# Patient Record
Sex: FEMALE | Race: WHITE | NOT HISPANIC OR LATINO | Employment: OTHER | URBAN - METROPOLITAN AREA
[De-identification: names, ages, dates, MRNs, and addresses within clinical notes are randomized per-mention and may not be internally consistent; named-entity substitution may affect disease eponyms.]

---

## 2023-08-25 LAB
6-ACETYLMORPHINE: <25 NG/ML
7-AMINOCLONAZEPAM: <25 NG/ML
ALPHA-HYDROXYALPRAZOLAM: <25 NG/ML
ALPHA-HYDROXYMIDAZOLAM: <25 NG/ML
ALPRAZOLAM: <25 NG/ML
AMPHETAMINE (PRESENCE) IN URINE BY SCREEN METHOD: ABNORMAL
BARBITURATES PRESENCE IN URINE BY SCREEN METHOD: ABNORMAL
CANNABINOIDS IN URINE BY SCREEN METHOD: ABNORMAL
CHLORDIAZEPOXIDE: <25 NG/ML
CLONAZEPAM: <25 NG/ML
COCAINE (PRESENCE) IN URINE BY SCREEN METHOD: ABNORMAL
CODEINE: <50 NG/ML
CREATINE, URINE FOR DRUG: 38.1 MG/DL
DIAZEPAM: <25 NG/ML
DRUG SCREEN COMMENT URINE: ABNORMAL
EDDP: <25 NG/ML
FENTANYL CONFIRMATION, URINE: <2.5 NG/ML
HYDROCODONE: <25 NG/ML
HYDROMORPHONE: <25 NG/ML
LORAZEPAM: <25 NG/ML
METHADONE CONFIRMATION,URINE: <25 NG/ML
MIDAZOLAM: <25 NG/ML
MORPHINE URINE: <50 NG/ML
NORDIAZEPAM: <25 NG/ML
NORFENTANYL: <2.5 NG/ML
NORHYDROCODONE: <25 NG/ML
NOROXYCODONE: <25 NG/ML
O-DESMETHYLTRAMADOL: >1000 NG/ML
OXAZEPAM: <25 NG/ML
OXYCODONE: <25 NG/ML
OXYMORPHONE: <25 NG/ML
PHENCYCLIDINE (PRESENCE) IN URINE BY SCREEN METHOD: ABNORMAL
TEMAZEPAM: <25 NG/ML
TRAMADOL: >1000 NG/ML
ZOLPIDEM METABOLITE (ZCA): <25 NG/ML
ZOLPIDEM: <25 NG/ML

## 2023-10-09 DIAGNOSIS — M47.812 CERVICAL SPONDYLOSIS: Primary | ICD-10-CM

## 2023-10-09 RX ORDER — TRAMADOL HYDROCHLORIDE 50 MG/1
50 TABLET ORAL 2 TIMES DAILY PRN
Qty: 60 TABLET | Refills: 0 | Status: SHIPPED | OUTPATIENT
Start: 2023-10-09 | End: 2023-11-06 | Stop reason: SDUPTHER

## 2023-10-09 RX ORDER — TRAMADOL HYDROCHLORIDE 50 MG/1
1 TABLET ORAL 2 TIMES DAILY PRN
COMMUNITY
Start: 2016-03-04 | End: 2023-10-09 | Stop reason: SDUPTHER

## 2023-10-09 RX ORDER — TRAMADOL HYDROCHLORIDE 50 MG/1
50 TABLET ORAL 2 TIMES DAILY PRN
Qty: 10 TABLET | Status: CANCELLED | OUTPATIENT
Start: 2023-10-09

## 2023-10-09 RX ORDER — AMLODIPINE BESYLATE 2.5 MG/1
2.5 TABLET ORAL ONCE
COMMUNITY

## 2023-11-06 DIAGNOSIS — M47.812 CERVICAL SPONDYLOSIS: ICD-10-CM

## 2023-11-06 RX ORDER — TRAMADOL HYDROCHLORIDE 50 MG/1
50 TABLET ORAL 2 TIMES DAILY PRN
Qty: 60 TABLET | Refills: 0 | Status: SHIPPED | OUTPATIENT
Start: 2023-11-06 | End: 2023-12-11 | Stop reason: SDUPTHER

## 2023-12-11 ENCOUNTER — OFFICE VISIT (OUTPATIENT)
Dept: RHEUMATOLOGY | Facility: CLINIC | Age: 83
End: 2023-12-11
Payer: MEDICARE

## 2023-12-11 VITALS
HEART RATE: 96 BPM | TEMPERATURE: 97.3 F | SYSTOLIC BLOOD PRESSURE: 152 MMHG | WEIGHT: 139.8 LBS | HEIGHT: 59 IN | DIASTOLIC BLOOD PRESSURE: 88 MMHG | OXYGEN SATURATION: 99 % | BODY MASS INDEX: 28.18 KG/M2

## 2023-12-11 DIAGNOSIS — M47.812 CERVICAL SPONDYLOSIS: ICD-10-CM

## 2023-12-11 DIAGNOSIS — R26.89 POOR BALANCE: ICD-10-CM

## 2023-12-11 DIAGNOSIS — M15.4 EROSIVE OSTEOARTHRITIS OF BOTH HANDS: ICD-10-CM

## 2023-12-11 DIAGNOSIS — M47.816 LUMBAR SPONDYLOSIS: ICD-10-CM

## 2023-12-11 DIAGNOSIS — M54.81 BILATERAL OCCIPITAL NEURALGIA: Primary | ICD-10-CM

## 2023-12-11 PROBLEM — I48.0 PAROXYSMAL ATRIAL FIBRILLATION (MULTI): Status: ACTIVE | Noted: 2023-12-11

## 2023-12-11 PROBLEM — E03.9 HYPOTHYROID: Status: ACTIVE | Noted: 2023-12-11

## 2023-12-11 PROBLEM — I10 HYPERTENSION: Status: ACTIVE | Noted: 2023-12-11

## 2023-12-11 PROBLEM — K22.70 BARRETT'S ESOPHAGUS WITHOUT DYSPLASIA: Status: ACTIVE | Noted: 2023-12-11

## 2023-12-11 PROCEDURE — 1159F MED LIST DOCD IN RCRD: CPT | Performed by: INTERNAL MEDICINE

## 2023-12-11 PROCEDURE — 1036F TOBACCO NON-USER: CPT | Performed by: INTERNAL MEDICINE

## 2023-12-11 PROCEDURE — 99214 OFFICE O/P EST MOD 30 MIN: CPT | Performed by: INTERNAL MEDICINE

## 2023-12-11 PROCEDURE — 3077F SYST BP >= 140 MM HG: CPT | Performed by: INTERNAL MEDICINE

## 2023-12-11 PROCEDURE — 1160F RVW MEDS BY RX/DR IN RCRD: CPT | Performed by: INTERNAL MEDICINE

## 2023-12-11 PROCEDURE — 3079F DIAST BP 80-89 MM HG: CPT | Performed by: INTERNAL MEDICINE

## 2023-12-11 RX ORDER — OMEPRAZOLE 20 MG/1
CAPSULE, DELAYED RELEASE ORAL
COMMUNITY
Start: 2019-03-05

## 2023-12-11 RX ORDER — ACETAMINOPHEN 325 MG/1
TABLET ORAL EVERY 24 HOURS
COMMUNITY
Start: 2022-03-23

## 2023-12-11 RX ORDER — BIOTIN 10 MG
TABLET ORAL
COMMUNITY
Start: 2015-03-24

## 2023-12-11 RX ORDER — TRAMADOL HYDROCHLORIDE 50 MG/1
50 TABLET ORAL 2 TIMES DAILY PRN
COMMUNITY
End: 2024-02-08 | Stop reason: SDUPTHER

## 2023-12-11 RX ORDER — LOSARTAN POTASSIUM 100 MG/1
TABLET ORAL
COMMUNITY

## 2023-12-11 RX ORDER — CALCIUM CARBONATE/VITAMIN D3 500-10/5ML
LIQUID (ML) ORAL
COMMUNITY
Start: 2015-03-24

## 2023-12-11 RX ORDER — LEVOTHYROXINE SODIUM 88 UG/1
TABLET ORAL
COMMUNITY
Start: 2014-12-16

## 2023-12-11 RX ORDER — RNA INGREDIENT BNT-162B2 0.23 G/1.8ML
INJECTION, SUSPENSION INTRAMUSCULAR
COMMUNITY

## 2023-12-11 RX ORDER — DEXTROMETHORPHAN POLISTIREX 30 MG/5 ML
SUSPENSION, EXTENDED RELEASE 12 HR ORAL
COMMUNITY
Start: 2015-03-24

## 2023-12-11 RX ORDER — GABAPENTIN 300 MG/1
300 CAPSULE ORAL 3 TIMES DAILY
COMMUNITY

## 2023-12-11 RX ORDER — APIXABAN 5 MG/1
TABLET, FILM COATED ORAL
COMMUNITY

## 2023-12-11 RX ORDER — VIT C/E/ZN/COPPR/LUTEIN/ZEAXAN 250MG-90MG
CAPSULE ORAL
COMMUNITY

## 2023-12-11 RX ORDER — GABAPENTIN 300 MG/1
CAPSULE ORAL
COMMUNITY
Start: 2016-11-03 | End: 2023-12-11 | Stop reason: WASHOUT

## 2023-12-11 RX ORDER — TRAMADOL HYDROCHLORIDE 50 MG/1
50 TABLET ORAL 2 TIMES DAILY PRN
Qty: 180 TABLET | Refills: 0 | Status: SHIPPED | OUTPATIENT
Start: 2023-12-11 | End: 2024-01-08 | Stop reason: SDUPTHER

## 2023-12-11 ASSESSMENT — PATIENT HEALTH QUESTIONNAIRE - PHQ9
2. FEELING DOWN, DEPRESSED OR HOPELESS: NOT AT ALL
SUM OF ALL RESPONSES TO PHQ9 QUESTIONS 1 AND 2: 0
1. LITTLE INTEREST OR PLEASURE IN DOING THINGS: NOT AT ALL

## 2023-12-11 ASSESSMENT — ENCOUNTER SYMPTOMS
DEPRESSION: 0
OCCASIONAL FEELINGS OF UNSTEADINESS: 0
LOSS OF SENSATION IN FEET: 0

## 2023-12-11 NOTE — PROGRESS NOTES
Subjective   Patient ID: Jessika Rick is a 83 y.o. female who presents for Follow-up.    HPI 83 year-old female here for follow-up regarding cervical and lumbar spondylosis, as well as occipital neuralgia. 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      Her pain is reasonably well controlled with tramadol 50 mg twice daily.  She gets left buttock pain off and on. Comes and goes.  She is having more trouble going up and down stairs due to knee pain.  Her laundry is in the basement, so she needs to walk down and up the basement stairs.  She completed physical therapy for leg strengthening at the end of March 2023, but she did not think it helped much.  She would like to try reducing tramadol to 50 mg once daily.    She bumper her left pretibial area against a stool last week, got skin tear in left pretibial area.     Bone density done 9/23 ([facility])     She had a previous right carpal tunnel release 2016. EMG 2016 did show moderate to severe bilateral carpal tunnel syndrome.     She had appointment with Dr. Savage 3/23 due to new atrial fibrillation, which was diagnosed 2/10/23. She started eliquis and metoprolol XL 25 mg daily.  30-day event monitor was done.     She has living will and HPOA (daughter Herminia Rick- [phone].      daughter Claudine Garcia- [phone])  She brought her living well documented to the office 5/22/23.     She had right hip revision by Dr. Hurtado March 21, 2022.     Current medications include tramadol 50 mg twice daily and gabapentin 300 mg 2 times daily (for occipital neuralgia).  Gabapentin was reduced due to leg edema.     Opioid contract renewed 8/21/23.. Urine drug screen done 8/02/22- ordered again 8/21/23.     Right hip was replaced 2/20.     She had Pfizer COVID-19 vaccine Jan 29, 2021 and Feb 26, 2021. She got booster October 5, 2021 and 8/22.  She had flu vaccine October 2022.     She started AREDS-2 for macular degeneration.  EMG study 5/16  showed moderate to severe carpal tunnel syndrome in both hands, as well as right C6-T1 radiculopathy.      MRI of cervical spine 6/16 showed possible pannus surrounding odontoid, multilevel degenerative disc disease, probable post traumatic or degenerative changes involving the lateral mass of C2 on the right, and anterior subluxations at C3 4, C4 5, C7-T1, T1-T2, and T2-T3.      She remains on gabapentin 300 mg 3 times daily for occipital neuralgia and tramadol 50 mg 2x daily.      She had right shoulder injected with kenalog 12/19. It was feeling better. She gets slight aching late in day.     She has slight numbness in both feet for the last year.     She has lost 3 1/2 inches in height. Neither parent fractured a hip.   Alendronate was stopped because she was diagnosed with Barretts's esophagus.     She did let me know that her  was recently diagnosed with bladder cancer.      DEXA 12/16 shows femoral neck T score -1.8, total hip T score -2.1, lumbar spine T score -1.3. Total hip BMD is declined 10.4%. Femoral neck BMD is 0.782 g/cm2 ([employer]).  Estimated 10 year fracture risk by FRAX is 5.5% for hip fracture and 16% for major osteoporotic fracture.  She started alendronate 4/17- it was stopped 9/17 because of Raaiza's esophagus.     DEXA 1/19: T score - 1.4 LS, T score -1.8 femoral neck, T score -2.0 total hip.  DEXA August 2021: T score -2.2 femoral neck, T score -2.2 total hip (decline of 6%), T score -1.4 lumbar spine  . Estimated 10-year fracture risk by FRAX is 5.53% for hip fracture and 16.76% for major osteoporotic fracture.     Labs 3/15: BUN 24, creatinine 1.3, KYA negative, rheumatoid factor negative, citrulline antibody negative, ESR 5.  Labs 6/16: ESR 4, CRP negative.   Labs 9/18: ESR 13, CRP 0.28   Labs 4/20: CMP normal, CBC normal  Labs March 2022: Hemoglobin 10.3, hematocrit 32.5, white blood cell count 5.32, platelets 338, CMP normal except albumin 3.8.       ROS:  General: Denies fevers  "or chills.  CV: Denies chest pain or palpitations.  Denies leg edema.  Lungs: Denies coughing or shortness of breath.  Skin: Denies rashes or nodules.  MS: Denies joint pain or joint swelling.     Objective   BP (!) 158/98 (BP Location: Left arm, Patient Position: Sitting, BP Cuff Size: Small adult)   Pulse 96   Temp 36.3 °C (97.3 °F)   Ht 1.499 m (4' 11\")   Wt 63.4 kg (139 lb 12.8 oz)   SpO2 99%   BMI 28.24 kg/m²     Physical Exam  Gen: Well nourished, well appearing.  HEENT: PERRL, EOMI  Neck: Supple, no nodes.  CV: Irregularly irregular rhythm.  No murmur heard.  Lungs: Clear, no rales or wheezes.  Abdomen: Soft, nontender. No hepatosplenomegaly.  Extremities:  No cyanosis, clubbing, or edema.  MS: No synovitis.  Mild kyphosis and scoliosis.  Bony prominence of several DIP joints, consistent with erosive OA.  Skin: No rashes or nodules.  Skin tear noted on left pretibial area.  No evidence of infection.      Assessment/Plan   Problem List Items Addressed This Visit             ICD-10-CM    Cervical spondylosis M47.812    Bilateral occipital neuralgia - Primary M54.81    Erosive osteoarthritis of both hands M15.4         1. Osteopenia- estimated 10 year fracture risk by FRAX is 5.5% for hip fracture, 16% for major osteoporotic fracture. 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 DEXA 8/21 is slightly worse. For now, she will continue calcium citrate 500 mg daily and vitamin D 1000 IU daily.   Next DEXA is due August 2023.     2. Cervical spondylosis and occipital neuralgia. Stable.   Continue gabapentin 300 mg 2 times daily.   Continue tramadol 50 mg twice daily as needed. Attempted to review OARRS reviewed today- site down,   Opioid contract signed 8/21/23.   Urine drug screen done 8/02/22- ordered 8/21/23.   Follow-up in 90 days.     3. Right hip pain-she had a right hip revision March 21, 2022 .she is currently progressing well.      4. Right rotator cuff tendinitis- " better after kenalog injection 12/19.      5. Erosive OA of hands- I advised the treatment is symptomatic. Nothing is proven to prevent progression.     6. BMI 28.2- better.     7. New onset atrial fib. Has established with Dr. Savage in cardiology. Will follow up in a few weeks.    8. Essential hypertension- above goal. Will follow up with cardiology in 2 weeks.     Plan:  Please send me a copy of your bone density.  Opioid contract signed August 21,2023. Urine drug screen done August 2023.-   You can try reducing tramadol to 50 mg once daily.  Referred for physical therapy to assist with balance and mobility.  Follow up in 3 months.  Phone sooner if needed.

## 2024-01-08 DIAGNOSIS — M47.812 CERVICAL SPONDYLOSIS: ICD-10-CM

## 2024-01-08 RX ORDER — TRAMADOL HYDROCHLORIDE 50 MG/1
50 TABLET ORAL 2 TIMES DAILY PRN
Qty: 180 TABLET | Refills: 0 | Status: SHIPPED | OUTPATIENT
Start: 2024-01-08 | End: 2024-04-15 | Stop reason: SDUPTHER

## 2024-01-11 ENCOUNTER — EVALUATION (OUTPATIENT)
Dept: PHYSICAL THERAPY | Facility: CLINIC | Age: 84
End: 2024-01-11
Payer: MEDICARE

## 2024-01-11 DIAGNOSIS — R26.89 POOR BALANCE: ICD-10-CM

## 2024-01-11 DIAGNOSIS — R26.89 BALANCE PROBLEM: Primary | ICD-10-CM

## 2024-01-11 DIAGNOSIS — M47.812 CERVICAL SPONDYLOSIS: ICD-10-CM

## 2024-01-11 DIAGNOSIS — M47.816 LUMBAR SPONDYLOSIS: ICD-10-CM

## 2024-01-11 PROCEDURE — 97110 THERAPEUTIC EXERCISES: CPT | Mod: GP

## 2024-01-11 PROCEDURE — 97161 PT EVAL LOW COMPLEX 20 MIN: CPT | Mod: GP

## 2024-01-11 ASSESSMENT — ENCOUNTER SYMPTOMS
DEPRESSION: 0
OCCASIONAL FEELINGS OF UNSTEADINESS: 1
LOSS OF SENSATION IN FEET: 1

## 2024-01-11 ASSESSMENT — PAIN - FUNCTIONAL ASSESSMENT: PAIN_FUNCTIONAL_ASSESSMENT: 0-10

## 2024-01-11 ASSESSMENT — PAIN SCALES - GENERAL: PAINLEVEL_OUTOF10: 0 - NO PAIN

## 2024-01-11 NOTE — LETTER
January 11, 2024    Sheron Fajardo, PT  [address]  [facility] [address]    Patient: Jessika Rick   YOB: 1940   Date of Visit: 1/11/2024       Dear Jeannie Esparza Md  [address]  [place] [address]    The attached plan of care is being sent to you because your patient’s medical reimbursement requires that you certify the plan of care. Your signature is required to allow uninterrupted insurance coverage.      You may indicate your approval by signing below and faxing this form back to us at Dept Fax: [phone].    Please call Dept: [phone] with any questions or concerns.    Thank you for this referral,        Sheron Fajardo PT  [facility] [address]  [facility]  [address] [facility] [id number] [phone]    Payer: Payor: [employer]ICARE / Plan: [employer] MEDICARE / Product Type: *No Product type* /                                                                         Date:     Dear Sheron Fajardo, PT,     Re: Ms. Jessika Rick, MRN:[id number]    I certify that I have reviewed the attached plan of care and it is medically necessary for Ms. Jessika Rick (1940) who is under my care.          ______________________________________                    _________________  Provider name and credentials                                           Date and time                                                                                           Plan of Care 1/11/24   Effective from: 1/11/2024  Effective to: 4/10/2024    Plan ID: [id number]            Participants as of Finalize on 1/11/2024    Name Type Comments Contact Info    Jeannie Esparza MD Referring Provider  [phone]    Sheron Fajardo PT Physical Therapist  [phone]       Last Plan Note     Author: Sheron Fajardo PT Status: Incomplete Last edited: 1/11/2024  4:00 PM       Physical Therapy Evaluation and Treatment       Patient Name: Jessika Rick  MRN: [id number]  Today's Date: 2024  Time Calculation  Start Time: 1627  Stop Time: 1710  Time Calculation (min): 43 min    Insurance reviewed   Visit number: 1  Approved number of visits: MN  Authorization not required after evaluation   Insurance Type: [facility]  Deductible: $0, $1500 OOP, 100% coverage  Onset Date: 2022  Medicare Certification Period: Beginnin2024 Endin/10/2024     Assessment:  Patient is an 83 year old female who presents for evaluation of chronic balance problems and gait deviations that are contributing to recent falls and patient concerns regarding to upcoming winter season. Patient presents today with notable postural deviations including forward head, rounded shoulders, increased thoracic kyphosis, and dowager's hump. Throughout gait cycle patient displays significant downward gaze leading to forward flexed trunk, L > R knee genu valgum, and R > L hip Trendelenberg consistent with history of R RAJANI and revision. Patient's presentation is consistent with MD prescribed cervical and lumbar spondylosis and patient-reported deficits in balance; she should benefit from skilled PT intervention focused on postural re-education, gait training, and fall prevention.  PT Assessment  PT Assessment Results: Decreased strength, Decreased range of motion, Impaired balance, Decreased mobility, Orthopedic restrictions  Rehab Prognosis: Fair     Plan:  OP PT Plan  Treatment/Interventions: Education/ Instruction, Gait training, Manual therapy, Neuromuscular re-education, Self care/ home management, Therapeutic activities, Therapeutic exercises  PT Plan: Skilled PT  PT Frequency: 1 time per week  Certification Period Start Date: 24  Certification Period End Date: 04/10/24    Current Problem:   1. Balance problem  Follow Up In Physical Therapy      2. Cervical spondylosis  Referral to Physical Therapy      3. Lumbar spondylosis  Referral to Physical Therapy  "     4. Poor balance  Referral to Physical Therapy        Subjective   Patient is an 83 year old female who presents for evaluation of chronic balance impairments that lead to a fall sustained in July 2023. Patient reports that while her balance has been an issue for 10+ years, it has worsened in the last 1-2 years limiting her ability to ambulate prolonged distances without fear. Patient has undergone therapeutic interventions in the past including PT and corticosteroid injections for underlying arthritis without notable relief.  Patient's chief complaint at this time is difficulty descending > ascending stairs at home, required of completing laundry in the basement. Patient does keep a straight cane in the car (from post-operative RAJANI) for prolonged community negotiation, but does not have it throughout today's evaluation.  Patient is scheduled to follow-up with rheumatology every 3 months and is seeing podiatry in 1-2 weeks due to \"bunion\". Patient does report neuropathies in B legs and feet for >1 year for which she is taking Eloquis, but she is concerned about notable LE pitting edema and lines from her socks. She was encouraged to wear compression socks/stockings, but has difficulty donning them independently.  General:  General  Reason for Referral: balance problems  Referred By: Dr. Roberson  Past Medical History Relevant to Rehab: R RAJANI and revision (2020 and 2022)  Precautions:  Precautions  STEADI Fall Risk Score (The score of 4 or more indicates an increased risk of falling): 9  Medical Precautions: No known precautions/limitation  Post-Surgical Precautions: Right hip precautions  Pain:  Pain Assessment  Pain Assessment: 0-10  Pain Score: 0 - No pain  Home Living:  Home Living  Home Living Comment: lives with daughter in ranch home, with laundry in basement (B handrails)  Prior Level of Function:  Prior Function Per Pt/Caregiver Report  Level of Gamaliel: Independent with ADLs and functional " "transfers  Receives Help From: Family  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, can walk extended distances with cart to push/support her balance)    Objective  Lumbar AROM:  Flexion: fingertips to malleoli  Extension: ~10%  L Lateral Flexion: 43.5 cm fingertips to floor  R Lateral Flexion: 41 cm fingertips to floor    Balance Assessment:  Rhomberg stance (EO): 30 seconds, minimal postural sway  Rhomberg stance (EC): 30 seconds, minimal postural sway  Tandem stance (L foot posterior): 14 seconds, 1 UE \"tap\" at 5 seconds  Tandem stance (R foot posterior): 19 seconds, moderate postural sway, R-sided Trendelenberg    Outcome Measures:  Other Measures  Oswestry Disablity Index (SCHUYLER): 22% disability     Treatments:  Access Code: [email or web address]  URL: [email or web address]    Exercises  - Seated Long Arc Quad  - 1-2 x daily - 2 sets - 10 reps - 3 seconds hold  - Standing Hip Abduction with Counter Support  - 1-2 x daily - 2 sets - 10 reps  - Standing Hip Extension with Counter Support  - 1-2 x daily - 2 sets - 10 reps  - Standing Knee Flexion with Counter Support  - 1-2 x daily - 2 sets - 10 reps  - Forward Step Up with Counter Support  - 1-2 x daily - 10 reps  - Lateral Step Up with Counter Support  - 1-2 x daily - 10 reps  - Semi-Tandem Corner Balance With Eyes Open  - as tolerated hold    EDUCATION:  Outpatient Education  Individual(s) Educated: Patient  Education Provided: Anatomy, Fall Risk, Home Exercise Program, POC, Posture  Risk and Benefits Discussed with Patient/Caregiver/Other: yes  Patient/Caregiver Demonstrated Understanding: yes  Plan of Care Discussed and Agreed Upon: yes  Patient Response to Education: Patient/Caregiver Verbalized Understanding of Information    Goals:  Patient will demonstrate independence with home exercise program in order to maximize carryover between treatment sessions by first follow-up (in ~1 month due to scheduling " conflicts).  Patient will be able to achieve and maintain tandem stance for >10 seconds B without UE support by time of discharge in order to minimize fall risk.  Patient will improve B lateral trunk flexion by >3 cm without provocation of symptoms and/or patient-reported fear of falling by time of discharge.  Patient will improve score on modified Oswestry to report <12% disability, by time of discharge, in order to demonstrate a clinically significant improvement and increased functional mobility/independence.         Current Participants as of 1/11/2024    Name Type Comments Contact Info    Jeannie Roberson MD Referring Provider  [phone]    Signature pending    Sheron Fajardo, PT Physical Therapist  [phone]    Signature pending

## 2024-01-11 NOTE — PROGRESS NOTES
Physical Therapy Evaluation and Treatment      Patient Name: Jessika Rick  MRN: [id number]  Today's Date: 2024  Time Calculation  Start Time: 1627  Stop Time: 1710  Time Calculation (min): 43 min    Insurance reviewed   Visit number: 1  Approved number of visits: MN  Authorization not required after evaluation   Insurance Type: [facility]  Deductible: $0, $1500 OOP, 100% coverage  Onset Date: 2022  Medicare Certification Period: Beginnin2024 Endin/10/2024     Assessment:  Patient is an 83 year old female who presents for evaluation of chronic balance problems and gait deviations that are contributing to recent falls and patient concerns regarding to upcoming winter season. Patient presents today with notable postural deviations including forward head, rounded shoulders, increased thoracic kyphosis, and dowager's hump. Throughout gait cycle patient displays significant downward gaze leading to forward flexed trunk, L > R knee genu valgum, and R > L hip Trendelenberg consistent with history of R RAJANI and revision. Patient's presentation is consistent with MD prescribed cervical and lumbar spondylosis and patient-reported deficits in balance; she should benefit from skilled PT intervention focused on postural re-education, gait training, and fall prevention.  PT Assessment  PT Assessment Results: Decreased strength, Decreased range of motion, Impaired balance, Decreased mobility, Orthopedic restrictions  Rehab Prognosis: Fair     Plan:  OP PT Plan  Treatment/Interventions: Education/ Instruction, Gait training, Manual therapy, Neuromuscular re-education, Self care/ home management, Therapeutic activities, Therapeutic exercises  PT Plan: Skilled PT  PT Frequency: 1 time per week  Certification Period Start Date: 24  Certification Period End Date: 04/10/24    Current Problem:   1. Balance problem  Follow Up In Physical Therapy      2. Cervical spondylosis  Referral to Physical Therapy      3.  "Lumbar spondylosis  Referral to Physical Therapy      4. Poor balance  Referral to Physical Therapy        Subjective    Patient is an 83 year old female who presents for evaluation of chronic balance impairments that lead to a fall sustained in July 2023. Patient reports that while her balance has been an issue for 10+ years, it has worsened in the last 1-2 years limiting her ability to ambulate prolonged distances without fear. Patient has undergone therapeutic interventions in the past including PT and corticosteroid injections for underlying arthritis without notable relief.  Patient's chief complaint at this time is difficulty descending > ascending stairs at home, required of completing laundry in the basement. Patient does keep a straight cane in the car (from post-operative RAJANI) for prolonged community negotiation, but does not have it throughout today's evaluation.  Patient is scheduled to follow-up with rheumatology every 3 months and is seeing podiatry in 1-2 weeks due to \"bunion\". Patient does report neuropathies in B legs and feet for >1 year for which she is taking Eloquis, but she is concerned about notable LE pitting edema and lines from her socks. She was encouraged to wear compression socks/stockings, but has difficulty donning them independently.  General:  General  Reason for Referral: balance problems  Referred By: Dr. Roberson  Past Medical History Relevant to Rehab: R RAJANI and revision (2020 and 2022)  Precautions:  Precautions  STEADI Fall Risk Score (The score of 4 or more indicates an increased risk of falling): 9  Medical Precautions: No known precautions/limitation  Post-Surgical Precautions: Right hip precautions  Pain:  Pain Assessment  Pain Assessment: 0-10  Pain Score: 0 - No pain  Home Living:  Home Living  Home Living Comment: lives with daughter in ranch home, with laundry in basement (B handrails)  Prior Level of Function:  Prior Function Per Pt/Caregiver Report  Level of " "[place]: Independent with ADLs and functional transfers  Receives Help From: Family  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, can walk extended distances with cart to push/support her balance)    Objective   Lumbar AROM:  Flexion: fingertips to malleoli  Extension: ~10%  L Lateral Flexion: 43.5 cm fingertips to floor  R Lateral Flexion: 41 cm fingertips to floor    Balance Assessment:  Rhomberg stance (EO): 30 seconds, minimal postural sway  Rhomberg stance (EC): 30 seconds, minimal postural sway  Tandem stance (L foot posterior): 14 seconds, 1 UE \"tap\" at 5 seconds  Tandem stance (R foot posterior): 19 seconds, moderate postural sway, R-sided Trendelenberg    Outcome Measures:  Other Measures  Oswestry Disablity Index (SCHUYLER): 22% disability     Treatments:  Access Code: [email or web address]  URL: [email or web address]    Exercises  - Seated Long Arc Quad  - 1-2 x daily - 2 sets - 10 reps - 3 seconds hold  - Standing Hip Abduction with Counter Support  - 1-2 x daily - 2 sets - 10 reps  - Standing Hip Extension with Counter Support  - 1-2 x daily - 2 sets - 10 reps  - Standing Knee Flexion with Counter Support  - 1-2 x daily - 2 sets - 10 reps  - Forward Step Up with Counter Support  - 1-2 x daily - 10 reps  - Lateral Step Up with Counter Support  - 1-2 x daily - 10 reps  - Semi-Tandem Corner Balance With Eyes Open  - as tolerated hold    EDUCATION:  Outpatient Education  Individual(s) Educated: Patient  Education Provided: Anatomy, Fall Risk, Home Exercise Program, POC, Posture  Risk and Benefits Discussed with Patient/Caregiver/Other: yes  Patient/Caregiver Demonstrated Understanding: yes  Plan of Care Discussed and Agreed Upon: yes  Patient Response to Education: Patient/Caregiver Verbalized Understanding of Information    Goals:  Patient will demonstrate independence with home exercise program in order to maximize carryover between treatment " sessions by first follow-up (in ~1 month due to scheduling conflicts).  Patient will be able to achieve and maintain tandem stance for >10 seconds B without UE support by time of discharge in order to minimize fall risk.  Patient will improve B lateral trunk flexion by >3 cm without provocation of symptoms and/or patient-reported fear of falling by time of discharge.  Patient will improve score on modified Oswestry to report <12% disability, by time of discharge, in order to demonstrate a clinically significant improvement and increased functional mobility/independence.

## 2024-02-08 DIAGNOSIS — M15.4 EROSIVE OSTEOARTHRITIS OF BOTH HANDS: Primary | ICD-10-CM

## 2024-02-08 RX ORDER — TRAMADOL HYDROCHLORIDE 50 MG/1
50 TABLET ORAL 2 TIMES DAILY PRN
Qty: 180 TABLET | Refills: 0 | Status: SHIPPED | OUTPATIENT
Start: 2024-02-08 | End: 2024-03-11 | Stop reason: SDUPTHER

## 2024-02-22 ENCOUNTER — APPOINTMENT (OUTPATIENT)
Dept: PHYSICAL THERAPY | Facility: CLINIC | Age: 84
End: 2024-02-22
Payer: MEDICARE

## 2024-03-11 ENCOUNTER — OFFICE VISIT (OUTPATIENT)
Dept: RHEUMATOLOGY | Facility: CLINIC | Age: 84
End: 2024-03-11
Payer: MEDICARE

## 2024-03-11 VITALS
WEIGHT: 135.6 LBS | SYSTOLIC BLOOD PRESSURE: 162 MMHG | HEART RATE: 76 BPM | TEMPERATURE: 97.8 F | HEIGHT: 59 IN | DIASTOLIC BLOOD PRESSURE: 90 MMHG | OXYGEN SATURATION: 95 % | BODY MASS INDEX: 27.34 KG/M2

## 2024-03-11 DIAGNOSIS — M15.4 EROSIVE OSTEOARTHRITIS OF BOTH HANDS: ICD-10-CM

## 2024-03-11 PROCEDURE — 3077F SYST BP >= 140 MM HG: CPT | Performed by: INTERNAL MEDICINE

## 2024-03-11 PROCEDURE — 1036F TOBACCO NON-USER: CPT | Performed by: INTERNAL MEDICINE

## 2024-03-11 PROCEDURE — 1158F ADVNC CARE PLAN TLK DOCD: CPT | Performed by: INTERNAL MEDICINE

## 2024-03-11 PROCEDURE — 1159F MED LIST DOCD IN RCRD: CPT | Performed by: INTERNAL MEDICINE

## 2024-03-11 PROCEDURE — 1126F AMNT PAIN NOTED NONE PRSNT: CPT | Performed by: INTERNAL MEDICINE

## 2024-03-11 PROCEDURE — 99214 OFFICE O/P EST MOD 30 MIN: CPT | Performed by: INTERNAL MEDICINE

## 2024-03-11 PROCEDURE — 3080F DIAST BP >= 90 MM HG: CPT | Performed by: INTERNAL MEDICINE

## 2024-03-11 PROCEDURE — 1160F RVW MEDS BY RX/DR IN RCRD: CPT | Performed by: INTERNAL MEDICINE

## 2024-03-11 PROCEDURE — 1123F ACP DISCUSS/DSCN MKR DOCD: CPT | Performed by: INTERNAL MEDICINE

## 2024-03-11 RX ORDER — FERROUS SULFATE TAB 325 MG (65 MG ELEMENTAL FE) 325 (65 FE) MG
1 TAB ORAL DAILY
COMMUNITY
Start: 2024-02-26

## 2024-03-11 RX ORDER — METOPROLOL SUCCINATE 25 MG/1
25 TABLET, EXTENDED RELEASE ORAL DAILY
COMMUNITY
Start: 2024-02-19

## 2024-03-11 RX ORDER — TRAMADOL HYDROCHLORIDE 50 MG/1
50 TABLET ORAL 2 TIMES DAILY PRN
Qty: 180 TABLET | Refills: 0 | Status: SHIPPED | OUTPATIENT
Start: 2024-03-11

## 2024-03-11 ASSESSMENT — PATIENT HEALTH QUESTIONNAIRE - PHQ9
1. LITTLE INTEREST OR PLEASURE IN DOING THINGS: NOT AT ALL
SUM OF ALL RESPONSES TO PHQ9 QUESTIONS 1 AND 2: 0
2. FEELING DOWN, DEPRESSED OR HOPELESS: NOT AT ALL

## 2024-03-11 NOTE — PROGRESS NOTES
Subjective   Patient ID: Jessika Rick is a 84 y.o. female who presents for Follow-up.    HPI 84 year-old female here for follow-up regarding cervical and lumbar spondylosis, as well as occipital neuralgia. 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      She has been somewhat more achy since the weather got colder.  She has some aching in her neck.  She has some aching in both hands.    She currently uses tramadol 50 mg twice daily and Tylenol 1000 mg 3 times daily as needed.    Bone density done 9/23 ([facility])     She had a previous right carpal tunnel release 2016. EMG 2016 did show moderate to severe bilateral carpal tunnel syndrome.     She had appointment with Dr. Savage 3/23 due to new atrial fibrillation, which was diagnosed 2/10/23. She started eliquis and metoprolol XL 25 mg daily.  30-day event monitor was done.  She has a follow-up visit scheduled for April 2024.     She has living will and HPOA (daughter Herminia Rick- [phone].      daughter Claudine Garcia- [phone])  She brought her living well documented to the office 5/22/23.  She is currently full code.     She had right hip revision by Dr. Hurtado March 21, 2022.     Current medications include tramadol 50 mg twice daily and gabapentin 300 mg 2 times daily (for occipital neuralgia).  Gabapentin was reduced due to leg edema.     Opioid contract renewed 8/21/23..  Urine drug screen was done August 2023.    Right hip was replaced 2/20.     She started AREDS-2 for macular degeneration.  EMG study 5/16 showed moderate to severe carpal tunnel syndrome in both hands, as well as right C6-T1 radiculopathy.      MRI of cervical spine 6/16 showed possible pannus surrounding odontoid, multilevel degenerative disc disease, probable post traumatic or degenerative changes involving the lateral mass of C2 on the right, and anterior subluxations at C3 4, C4 5, C7-T1, T1-T2, and T2-T3.      She remains on gabapentin 300 mg 2times  "daily for occipital neuralgia and tramadol 50 mg 2x daily.      She had right shoulder injected with kenalog 12/19. It was feeling better. She gets slight aching late in day.     She has slight numbness in both feet for the last year.     She has lost 3 1/2 inches in height. Neither parent fractured a hip.   Alendronate was stopped because she was diagnosed with Barretts's esophagus.     She did let me know that her  was recently diagnosed with bladder cancer.      DEXA 12/16 shows femoral neck T score -1.8, total hip T score -2.1, lumbar spine T score -1.3. Total hip BMD is declined 10.4%. Femoral neck BMD is 0.782 g/cm2 ([employer]).  Estimated 10 year fracture risk by FRAX is 5.5% for hip fracture and 16% for major osteoporotic fracture.  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     DEXA 1/19: T score - 1.4 LS, T score -1.8 femoral neck, T score -2.0 total hip.  DEXA August 2021: T score -2.2 femoral neck, T score -2.2 total hip (decline of 6%), T score -1.4 lumbar spine  . Estimated 10-year fracture risk by FRAX is 5.53% for hip fracture and 16.76% for major osteoporotic fracture.     Labs 3/15: BUN 24, creatinine 1.3, KYA negative, rheumatoid factor negative, citrulline antibody negative, ESR 5.  Labs 6/16: ESR 4, CRP negative.   Labs 9/18: ESR 13, CRP 0.28   Labs 4/20: CMP normal, CBC normal  Labs March 2022: Hemoglobin 10.3, hematocrit 32.5, white blood cell count 5.32, platelets 338, CMP normal except albumin 3.8.       ROS:  General: Denies fevers or chills.  CV: Denies chest pain or palpitations.  Denies leg edema.  Lungs: Denies coughing or shortness of breath.  Skin: Denies rashes or nodules.  MS: Denies joint pain or joint swelling.     Objective   BP (!) 146/100 (BP Location: Left arm, Patient Position: Sitting, BP Cuff Size: Small adult)   Pulse 76   Temp 36.6 °C (97.8 °F)   Ht 1.499 m (4' 11\")   Wt 61.5 kg (135 lb 9.6 oz)   SpO2 95%   BMI 27.39 kg/m² "     Physical Exam  Gen: Well nourished, well appearing.  HEENT: PERRL, EOMI  Neck: Supple, no nodes.  CV: Irregularly irregular rhythm.  No murmur heard.  Lungs: Clear, no rales or wheezes.  Abdomen: Soft, nontender. No hepatosplenomegaly.  Extremities:  No cyanosis, clubbing, or edema.  MS: No synovitis.  Mild kyphosis and scoliosis.  Bony prominence of several DIP joints, consistent with erosive OA.  Skin: No rashes or nodules.  Skin tear noted on left pretibial area.  No evidence of infection.      Assessment/Plan   Problem List Items Addressed This Visit             ICD-10-CM    Erosive osteoarthritis of both hands M15.4       1. Osteopenia- estimated 10 year fracture risk by FRAX is 5.5% for hip fracture, 16% for major osteoporotic fracture. 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 DEXA 8/21 is slightly worse. For now, she will continue calcium citrate 500 mg daily and vitamin D 1000 IU daily.   Next DEXA is due August 2023. DEXA was done 9/23 through [facility].     2. Cervical spondylosis and occipital neuralgia. Stable.   Continue gabapentin 300 mg 2 times daily.   Continue tramadol 50 mg twice daily as needed. Attempted to review OARRS reviewed today- site down,   Opioid contract signed 8/21/23.   Urine drug screen done 8/21/23.   Follow-up in 90 days.     3. Right hip pain-she had a right hip revision March 21, 2022 .she is currently progressing well.      4. Right rotator cuff tendinitis- better after kenalog injection 12/19.      5. Erosive OA of hands- I advised the treatment is symptomatic. Nothing is proven to prevent progression.     6. BMI 27.4- better.     7. Atrial fib. Has established with Dr. Savage in cardiology. [id number] score at least 4. On Eliquis. Sees Dr. Savage. Will follow up in a few weeks.    8. Essential hypertension- above goal. She has not taken any blood pressure pills today. She has follow up with PCP and Dr. Savage 4/24. I asked her to  start monitoring blood pressure at home.    9. ACP- she has living will. Daughter Herminia is HPOA. She is full code.    Plan:  Opioid contract signed August 21,2023. Urine drug screen done August 2023.-   Tramadol was renewed.  Please start checking blood pressures at home.  Follow up in 3 months.  Phone sooner if needed.

## 2024-03-11 NOTE — PATIENT INSTRUCTIONS
Opioid contract signed August 21,2023. Urine drug screen done August 2023.-   Tramadol was renewed.  Please start checking blood pressures at home.  Follow up in 3 months.  Phone sooner if needed.

## 2024-04-05 ENCOUNTER — APPOINTMENT (OUTPATIENT)
Dept: CARDIOLOGY | Facility: HOSPITAL | Age: 84
End: 2024-04-05
Payer: MEDICARE

## 2024-04-05 ENCOUNTER — HOSPITAL ENCOUNTER (EMERGENCY)
Facility: HOSPITAL | Age: 84
Discharge: HOME | End: 2024-04-05
Attending: STUDENT IN AN ORGANIZED HEALTH CARE EDUCATION/TRAINING PROGRAM
Payer: MEDICARE

## 2024-04-05 VITALS
BODY MASS INDEX: 27.42 KG/M2 | SYSTOLIC BLOOD PRESSURE: 164 MMHG | HEART RATE: 102 BPM | HEIGHT: 59 IN | DIASTOLIC BLOOD PRESSURE: 94 MMHG | TEMPERATURE: 98.1 F | WEIGHT: 136 LBS | RESPIRATION RATE: 16 BRPM | OXYGEN SATURATION: 97 %

## 2024-04-05 DIAGNOSIS — S16.1XXA STRAIN OF NECK MUSCLE, INITIAL ENCOUNTER: Primary | ICD-10-CM

## 2024-04-05 PROCEDURE — 93005 ELECTROCARDIOGRAM TRACING: CPT

## 2024-04-05 PROCEDURE — 99283 EMERGENCY DEPT VISIT LOW MDM: CPT | Mod: 25

## 2024-04-05 PROCEDURE — 2500000001 HC RX 250 WO HCPCS SELF ADMINISTERED DRUGS (ALT 637 FOR MEDICARE OP)

## 2024-04-05 PROCEDURE — 93010 ELECTROCARDIOGRAM REPORT: CPT | Performed by: STUDENT IN AN ORGANIZED HEALTH CARE EDUCATION/TRAINING PROGRAM

## 2024-04-05 PROCEDURE — 99284 EMERGENCY DEPT VISIT MOD MDM: CPT | Performed by: STUDENT IN AN ORGANIZED HEALTH CARE EDUCATION/TRAINING PROGRAM

## 2024-04-05 RX ORDER — CYCLOBENZAPRINE HCL 5 MG
5 TABLET ORAL ONCE
Status: COMPLETED | OUTPATIENT
Start: 2024-04-05 | End: 2024-04-05

## 2024-04-05 RX ORDER — ACETAMINOPHEN 325 MG/1
650 TABLET ORAL ONCE
Status: COMPLETED | OUTPATIENT
Start: 2024-04-05 | End: 2024-04-05

## 2024-04-05 RX ORDER — CYCLOBENZAPRINE HCL 5 MG
5 TABLET ORAL 3 TIMES DAILY PRN
Qty: 10 TABLET | Refills: 0 | Status: SHIPPED | OUTPATIENT
Start: 2024-04-05

## 2024-04-05 RX ADMIN — CYCLOBENZAPRINE HYDROCHLORIDE 5 MG: 5 TABLET, FILM COATED ORAL at 08:01

## 2024-04-05 RX ADMIN — ACETAMINOPHEN 650 MG: 325 TABLET ORAL at 08:01

## 2024-04-05 ASSESSMENT — PAIN DESCRIPTION - LOCATION
LOCATION: HEAD
LOCATION: NECK

## 2024-04-05 ASSESSMENT — PAIN SCALES - GENERAL
PAINLEVEL_OUTOF10: 9
PAINLEVEL_OUTOF10: 8
PAINLEVEL_OUTOF10: 9
PAINLEVEL_OUTOF10: 5 - MODERATE PAIN
PAINLEVEL_OUTOF10: 10 - WORST POSSIBLE PAIN

## 2024-04-05 ASSESSMENT — PAIN - FUNCTIONAL ASSESSMENT
PAIN_FUNCTIONAL_ASSESSMENT: 0-10
PAIN_FUNCTIONAL_ASSESSMENT: 0-10

## 2024-04-05 ASSESSMENT — COLUMBIA-SUICIDE SEVERITY RATING SCALE - C-SSRS
6. HAVE YOU EVER DONE ANYTHING, STARTED TO DO ANYTHING, OR PREPARED TO DO ANYTHING TO END YOUR LIFE?: NO
2. HAVE YOU ACTUALLY HAD ANY THOUGHTS OF KILLING YOURSELF?: NO
1. IN THE PAST MONTH, HAVE YOU WISHED YOU WERE DEAD OR WISHED YOU COULD GO TO SLEEP AND NOT WAKE UP?: NO

## 2024-04-05 ASSESSMENT — LIFESTYLE VARIABLES
EVER HAD A DRINK FIRST THING IN THE MORNING TO STEADY YOUR NERVES TO GET RID OF A HANGOVER: NO
HAVE YOU EVER FELT YOU SHOULD CUT DOWN ON YOUR DRINKING: NO
EVER FELT BAD OR GUILTY ABOUT YOUR DRINKING: NO
HAVE PEOPLE ANNOYED YOU BY CRITICIZING YOUR DRINKING: NO
TOTAL SCORE: 0

## 2024-04-05 ASSESSMENT — PAIN DESCRIPTION - DESCRIPTORS: DESCRIPTORS: ACHING

## 2024-04-05 ASSESSMENT — PAIN DESCRIPTION - PAIN TYPE: TYPE: ACUTE PAIN

## 2024-04-05 ASSESSMENT — PAIN DESCRIPTION - FREQUENCY: FREQUENCY: CONSTANT/CONTINUOUS

## 2024-04-05 ASSESSMENT — PAIN DESCRIPTION - ONSET: ONSET: ONGOING

## 2024-04-05 ASSESSMENT — PAIN DESCRIPTION - PROGRESSION: CLINICAL_PROGRESSION: NOT CHANGED

## 2024-04-05 NOTE — ED PROVIDER NOTES
HPI   Chief Complaint   Patient presents with    Headache     Headache that radiates into the neck x3 days. Pt has hx of this. Had ablation done 8 years ago. Limited ROM. Denies any other sx at this time.        Patient is 84-year-old female with atrial fibrillation on Eliquis, hypothyroidism presenting to the emergency department for neck pain and headache.  This pain began 3 days ago.  It is on the right side of her neck and radiates up to the back of her head and on the right side.  She feels as if her scalp is very sensitive to the touch.  Her headache worsens when she turns her head.  She feels like her neck is having a pulling sensation with movement.  She denies any numbness or tingling of her face, arms or legs.  She is here with her daughter.  She has not noticed any facial droop, slurred speech, confusion.  The patient does have history of occipital neuralgia, although this does not feel like her typical neuralgia.  She had a migraine for this occipital neuralgia 8 years ago.  They did tell her that the pain may return.  She does not have any hardware or other surgeries of the neck.  She does not have any midline neck pain.  No trauma.      History provided by:  Patient and relative                      [place] Coma Scale Score: 15                     Patient History   Past Medical History:   Diagnosis Date    Arthritis     Atrial fibrillation (CMS/HCC)     CHF (congestive heart failure) (CMS/HCC)     Disease of thyroid gland     Hypertension     Personal history of other diseases of the digestive system     History of hiatal hernia    Personal history of other diseases of the nervous system and sense organs     History of cataract    Personal history of other diseases of urinary system     History of bladder problems    Personal history of other specified conditions     History of heartburn     Past Surgical History:   Procedure Laterality Date    GALLBLADDER SURGERY  03/24/2015    Gallbladder Surgery     OOPHORECTOMY  03/24/2015    Oophorectomy - Unilateral (Removal Of One Ovary)    OTHER SURGICAL HISTORY  04/16/2021    Hip replacement    TONSILLECTOMY  03/24/2015    Tonsillectomy    VARICOSE VEIN SURGERY  12/18/2015    Varicose Vein Ligation     Family History   Problem Relation Name Age of Onset    Hypertension Mother      Stroke Mother      Heart failure Father      Diabetes Brother      Kidney disease Brother       Social History     Tobacco Use    Smoking status: Never     Passive exposure: Never    Smokeless tobacco: Never   Substance Use Topics    Alcohol use: Yes     Comment: socially    Drug use: Never       Physical Exam   ED Triage Vitals [04/05/24 0724]   Temperature Heart Rate Respirations BP   36.6 °C (97.9 °F) (!) 102 16 (!) 188/111      Pulse Ox Temp src Heart Rate Source Patient Position   96 % -- -- --      BP Location FiO2 (%)     -- --       Physical Exam  Vitals and nursing note reviewed.   Constitutional:       General: She is not in acute distress.     Appearance: She is well-developed.   HENT:      Head: Normocephalic and atraumatic.      Right Ear: External ear normal.      Left Ear: External ear normal.      Nose: Nose normal.      Mouth/Throat:      Mouth: Mucous membranes are moist.   Eyes:      General: No scleral icterus.     Extraocular Movements: Extraocular movements intact.      Conjunctiva/sclera: Conjunctivae normal.      Pupils: Pupils are equal, round, and reactive to light.   Cardiovascular:      Rate and Rhythm: Normal rate. Rhythm irregular.      Heart sounds: No murmur heard.  Pulmonary:      Effort: Pulmonary effort is normal. No respiratory distress.      Breath sounds: Normal breath sounds.   Abdominal:      Palpations: Abdomen is soft.      Tenderness: There is no abdominal tenderness.   Musculoskeletal:         General: No swelling.      Cervical back: Neck supple.   Skin:     General: Skin is warm and dry.   Neurological:      General: No focal deficit present.       Mental Status: She is alert.      Cranial Nerves: No cranial nerve deficit.      Sensory: No sensory deficit.      Motor: No weakness.      Coordination: Coordination normal.      Gait: Gait normal.      Comments: No weakness of the extremities, facial droop, slurred speech, confusion.  Normal finger-nose, heel-to-shin testing.  No ataxia on ambulation.   Psychiatric:         Mood and Affect: Mood normal.         ED Course & MDM   Diagnoses as of 04/05/24 0811   Strain of neck muscle, initial encounter       Medical Decision Making  Patient is a 84-year-old female presenting to the emergency department for neck pain and headache.  On arrival, she is noted for slight tach 103, and EKG shows atrial fibrillation with rates in the 90s.  She has known history of atrial fibrillation and is anticoagulated.  No acute injury pattern on EKG.  She is hemodynamically stable, afebrile, no acute distress.  She does not have any neurological symptoms such as ataxia, facial droop, slurred speech, numbness or weakness, abnormal coordination.  No trauma.  Low suspicion for a vascular neurological etiology such as vertebral artery dissection/occlusion, posterior stroke, intracranial hemorrhage.  The pain is reproducible with palpation and with movement of her neck, which she also describes as a pulling sensation.  She also reports some sensitivity to the scalp on the right side.  This is more consistent musculoskeletal pain, possibly reemergence of her occipital neuralgia as well.  Patient is given Flexeril and Tylenol here.  Patient is discharged with a prescription for Flexeril.  Home care and return instructions discussed. Patient expressed understanding and agreement. Patient discharged in stable condition.    Guru Gastelum DO, PGY-3  Emergency Medicine Resident    Amount and/or Complexity of Data Reviewed  ECG/medicine tests: ordered.     Details: EKG at 0756 on 4/5/2024 as interpreted by myself: Ventricular rate 91, QRS 80, QTc  450.  Atrial fibrillation.  No acute injury pattern.        Procedure  Procedures     Guru Gastelum DO  Resident  04/05/24 0816       Guru Gastelum DO  Resident  04/05/24 0819

## 2024-04-05 NOTE — DISCHARGE INSTRUCTIONS
You may take Tylenol as needed for your pain.  We have also prescribed you a Flexeril 5 mg, which is a muscle relaxer.  However, it can make you sleepy and tired.  If this medication is too strong for you, you may cut them in half.  Do not drive or operate heavy machinery while you are on this medication.  If you develop any neurological symptoms such as weakness or numbness on one side of the body, facial droop, slurred speech, confusion, fainting, please return to the emergency department.  Please follow-up with your primary care physician 2 to 3 days.

## 2024-04-15 DIAGNOSIS — M47.812 CERVICAL SPONDYLOSIS: ICD-10-CM

## 2024-04-15 RX ORDER — TRAMADOL HYDROCHLORIDE 50 MG/1
50 TABLET ORAL 2 TIMES DAILY PRN
Qty: 180 TABLET | Refills: 0 | Status: SHIPPED | OUTPATIENT
Start: 2024-04-15

## 2024-04-21 LAB
ATRIAL RATE: 178 BPM
Q ONSET: 220 MS
QRS COUNT: 15 BEATS
QRS DURATION: 80 MS
QT INTERVAL: 366 MS
QTC CALCULATION(BAZETT): 450 MS
QTC FREDERICIA: 420 MS
R AXIS: 70 DEGREES
T AXIS: 1 DEGREES
T OFFSET: 403 MS
VENTRICULAR RATE: 91 BPM

## 2024-06-04 ENCOUNTER — OFFICE VISIT (OUTPATIENT)
Dept: RHEUMATOLOGY | Facility: CLINIC | Age: 84
End: 2024-06-04
Payer: MEDICARE

## 2024-06-04 VITALS
HEIGHT: 59 IN | TEMPERATURE: 98 F | WEIGHT: 133.2 LBS | HEART RATE: 86 BPM | DIASTOLIC BLOOD PRESSURE: 84 MMHG | BODY MASS INDEX: 26.85 KG/M2 | OXYGEN SATURATION: 96 % | SYSTOLIC BLOOD PRESSURE: 132 MMHG

## 2024-06-04 DIAGNOSIS — M15.4 EROSIVE OSTEOARTHRITIS OF BOTH HANDS: ICD-10-CM

## 2024-06-04 DIAGNOSIS — M54.81 BILATERAL OCCIPITAL NEURALGIA: ICD-10-CM

## 2024-06-04 DIAGNOSIS — M47.812 CERVICAL SPONDYLOSIS: Primary | ICD-10-CM

## 2024-06-04 PROBLEM — M25.559 PAIN IN JOINT INVOLVING PELVIC REGION AND THIGH: Status: ACTIVE | Noted: 2024-05-10

## 2024-06-04 PROBLEM — L03.90 CELLULITIS: Status: ACTIVE | Noted: 2024-06-04

## 2024-06-04 PROCEDURE — 3075F SYST BP GE 130 - 139MM HG: CPT | Performed by: INTERNAL MEDICINE

## 2024-06-04 PROCEDURE — 3079F DIAST BP 80-89 MM HG: CPT | Performed by: INTERNAL MEDICINE

## 2024-06-04 PROCEDURE — 1123F ACP DISCUSS/DSCN MKR DOCD: CPT | Performed by: INTERNAL MEDICINE

## 2024-06-04 PROCEDURE — 1160F RVW MEDS BY RX/DR IN RCRD: CPT | Performed by: INTERNAL MEDICINE

## 2024-06-04 PROCEDURE — 1158F ADVNC CARE PLAN TLK DOCD: CPT | Performed by: INTERNAL MEDICINE

## 2024-06-04 PROCEDURE — 99214 OFFICE O/P EST MOD 30 MIN: CPT | Performed by: INTERNAL MEDICINE

## 2024-06-04 PROCEDURE — 1159F MED LIST DOCD IN RCRD: CPT | Performed by: INTERNAL MEDICINE

## 2024-06-04 RX ORDER — FUROSEMIDE 20 MG/1
20 TABLET ORAL DAILY
COMMUNITY
Start: 2024-05-24

## 2024-06-04 RX ORDER — CYANOCOBALAMIN (VITAMIN B-12) 3000MCG/ML
1 DROPS SUBLINGUAL DAILY
COMMUNITY

## 2024-06-04 NOTE — PATIENT INSTRUCTIONS
Continue same medication.  Opioid contract signed August 21,2023. Urine drug screen done August 2023.-        Follow-up in 3 months.

## 2024-06-04 NOTE — PROGRESS NOTES
Subjective   Patient ID: Jessika Rick is a 84 y.o. female who presents for Follow-up.    HPI 84 year-old female here for follow-up regarding cervical and lumbar spondylosis, as well as occipital neuralgia. 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      She had a recent flare of occipital neuralgia, which was in the right side of her neck and radiate to the occiput.  It occurred 5 weeks ago.  It was severe for 1 to 2 weeks.  She did physical therapy.  The symptoms have now essentially subsided, although she is scheduled to get a radiofrequency ablation next week.  (She had another radiofrequency ablation about 2 years ago which was helpful).    She currently uses tramadol 50 mg twice daily and Tylenol 1000 mg 3 times daily as needed.  She laso takes gabapentin 300 mg 3x daily.    Bone density done 9/23 ([facility])     She had a previous right carpal tunnel release 2016. EMG 2016 did show moderate to severe bilateral carpal tunnel syndrome.     She had appointment with Dr. Savage 3/23 due to new atrial fibrillation, which was diagnosed 2/10/23. She started eliquis and metoprolol XL 25 mg daily.  30-day event monitor was done.  She has a follow-up visit scheduled for April 2024.     She has living will and HPOA (daughter Herminia Rick- [phone].      daughter Claudine Garcia- [phone])  She brought her living well documented to the office 5/22/23.  She is currently full code.     She had right hip revision by Dr. Hurtado March 21, 2022.     Current medications include tramadol 50 mg twice daily and gabapentin 300 mg 2 times daily (for occipital neuralgia).  Gabapentin was reduced due to leg edema.     Opioid contract renewed 8/21/23..  Urine drug screen was done August 2023.    Right hip was replaced 2/20.     She started AREDS-2 for macular degeneration.  EMG study 5/16 showed moderate to severe carpal tunnel syndrome in both hands, as well as right C6-T1 radiculopathy.      MRI of  cervical spine 6/16 showed possible pannus surrounding odontoid, multilevel degenerative disc disease, probable post traumatic or degenerative changes involving the lateral mass of C2 on the right, and anterior subluxations at C3 4, C4 5, C7-T1, T1-T2, and T2-T3.      She remains on gabapentin 300 mg 2times daily for occipital neuralgia and tramadol 50 mg 2x daily.      She had right shoulder injected with kenalog 12/19. It was feeling better. She gets slight aching late in day.     She has slight numbness in both feet for the last year.     She has lost 3 1/2 inches in height. Neither parent fractured a hip.   Alendronate was stopped because she was diagnosed with Barretts's esophagus.     She did let me know that her  was recently diagnosed with bladder cancer.      DEXA 12/16 shows femoral neck T score -1.8, total hip T score -2.1, lumbar spine T score -1.3. Total hip BMD is declined 10.4%. Femoral neck BMD is 0.782 g/cm2 ([employer]).  Estimated 10 year fracture risk by FRAX is 5.5% for hip fracture and 16% for major osteoporotic fracture.  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     DEXA 1/19: T score - 1.4 LS, T score -1.8 femoral neck, T score -2.0 total hip.  DEXA August 2021: T score -2.2 femoral neck, T score -2.2 total hip (decline of 6%), T score -1.4 lumbar spine  . Estimated 10-year fracture risk by FRAX is 5.53% for hip fracture and 16.76% for major osteoporotic fracture.     Labs 3/15: BUN 24, creatinine 1.3, KYA negative, rheumatoid factor negative, citrulline antibody negative, ESR 5.  Labs 6/16: ESR 4, CRP negative.   Labs 9/18: ESR 13, CRP 0.28   Labs 4/20: CMP normal, CBC normal  Labs March 2022: Hemoglobin 10.3, hematocrit 32.5, white blood cell count 5.32, platelets 338, CMP normal except albumin 3.8.       ROS:  General: Denies fevers or chills.  CV: Denies chest pain or palpitations.  Denies leg edema.  Lungs: Denies coughing or shortness of  "breath.  Skin: Denies rashes or nodules.  MS: Recent flare of occipital neuralgia on the right-rating from the right neck to the occiput.  Symptoms were sharp and severe, occurred 5 weeks ago.  Symptoms have now subsided-she is scheduled for radiofrequency ablation next week.    Objective   /84 (BP Location: Left arm, Patient Position: Sitting, BP Cuff Size: Small adult)   Pulse 86   Temp 36.7 °C (98 °F)   Ht 1.499 m (4' 11\")   Wt 60.4 kg (133 lb 3.2 oz)   SpO2 96%   BMI 26.90 kg/m²     Physical Exam  Gen: Well nourished, well appearing.  HEENT: PERRL, EOMI  Neck: Supple, no nodes.  CV: Irregularly irregular rhythm.  No murmur heard.  Lungs: Clear, no rales or wheezes.  Abdomen: Soft, nontender. No hepatosplenomegaly.  Extremities:  No cyanosis, clubbing, or edema.  MS: No synovitis.  Mild kyphosis and scoliosis.  Bony prominence of several DIP joints, consistent with erosive OA.  Skin: No rashes or nodules.        Assessment/Plan     Problem List Items Addressed This Visit             ICD-10-CM    Cervical spondylosis - Primary M47.812    Bilateral occipital neuralgia M54.81    Erosive osteoarthritis of both hands M15.4    BMI 26.0-26.9,adult Z68.26         1. Osteopenia- estimated 10 year fracture risk by FRAX is 5.5% for hip fracture, 16% for major osteoporotic fracture. 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 DEXA 8/21 is slightly worse. For now, she will continue calcium citrate 500 mg daily and vitamin D 1000 IU daily.   Next DEXA is due August 2023. DEXA was done 9/23 through [facility].     2. Cervical spondylosis and occipital neuralgia.  She had a recent flare of occipital neuralgia, rating from the right neck to the occiput.  Symptoms were sharp and severe.  Symptoms have subsided, but she is scheduled for radiofrequency ablation next week.   Continue gabapentin 300 mg 2 times daily.   Continue tramadol 50 mg twice daily as needed.   Opioid contract signed " 8/21/23.   Urine drug screen done 8/21/23.   Follow-up in 90 days.     3. Right hip pain-she had a right hip revision March 21, 2022 .she is currently progressing well.      4. Right rotator cuff tendinitis- better after kenalog injection 12/19.      5. Erosive OA of hands- I advised the treatment is symptomatic. Nothing is proven to prevent progression.     6. BMI 26.9- better.     7. Atrial fib. Has established with Dr. Savage in cardiology. [id number] score at least 4. On Eliquis. Sees Dr. Savage. Will follow up in a few weeks.    8. Essential hypertension- at goal. .    9. ACP- she has living will. Daughter Herminia is HPOA. She is full code.    Plan:      Continue same medication.  Opioid contract signed August 21,2023. Urine drug screen done August 2023.-        Follow-up in 3 months.

## 2024-06-05 PROBLEM — L03.90 CELLULITIS: Status: RESOLVED | Noted: 2024-06-04 | Resolved: 2024-06-05

## 2024-06-13 ENCOUNTER — APPOINTMENT (OUTPATIENT)
Dept: RHEUMATOLOGY | Facility: CLINIC | Age: 84
End: 2024-06-13
Payer: MEDICARE

## 2024-09-09 ENCOUNTER — APPOINTMENT (OUTPATIENT)
Dept: RHEUMATOLOGY | Facility: CLINIC | Age: 84
End: 2024-09-09
Payer: MEDICARE

## 2024-09-09 ENCOUNTER — LAB (OUTPATIENT)
Dept: LAB | Facility: LAB | Age: 84
End: 2024-09-09
Payer: MEDICARE

## 2024-09-09 VITALS
SYSTOLIC BLOOD PRESSURE: 138 MMHG | RESPIRATION RATE: 14 BRPM | DIASTOLIC BLOOD PRESSURE: 76 MMHG | HEART RATE: 63 BPM | WEIGHT: 133 LBS | TEMPERATURE: 98.4 F | OXYGEN SATURATION: 100 % | HEIGHT: 59 IN | BODY MASS INDEX: 26.81 KG/M2

## 2024-09-09 DIAGNOSIS — Z79.899 HIGH RISK MEDICATION USE: ICD-10-CM

## 2024-09-09 DIAGNOSIS — M15.4 EROSIVE OSTEOARTHRITIS OF BOTH HANDS: ICD-10-CM

## 2024-09-09 DIAGNOSIS — Z79.899 HIGH RISK MEDICATION USE: Primary | ICD-10-CM

## 2024-09-09 DIAGNOSIS — Z00.00 HEALTHCARE MAINTENANCE: ICD-10-CM

## 2024-09-09 DIAGNOSIS — M47.812 CERVICAL SPONDYLOSIS: ICD-10-CM

## 2024-09-09 LAB
AMPHETAMINES UR QL SCN: NORMAL
BARBITURATES UR QL SCN: NORMAL
BZE UR QL SCN: NORMAL
CANNABINOIDS UR QL SCN: NORMAL
CREAT UR-MCNC: 68.8 MG/DL (ref 20–320)
PCP UR QL SCN: NORMAL

## 2024-09-09 PROCEDURE — 80354 DRUG SCREENING FENTANYL: CPT

## 2024-09-09 PROCEDURE — 80368 SEDATIVE HYPNOTICS: CPT

## 2024-09-09 PROCEDURE — 80361 OPIATES 1 OR MORE: CPT

## 2024-09-09 PROCEDURE — 80373 DRUG SCREENING TRAMADOL: CPT

## 2024-09-09 PROCEDURE — 80307 DRUG TEST PRSMV CHEM ANLYZR: CPT

## 2024-09-09 PROCEDURE — 80346 BENZODIAZEPINES1-12: CPT

## 2024-09-09 PROCEDURE — 80358 DRUG SCREENING METHADONE: CPT

## 2024-09-09 PROCEDURE — 80365 DRUG SCREENING OXYCODONE: CPT

## 2024-09-09 PROCEDURE — 82570 ASSAY OF URINE CREATININE: CPT

## 2024-09-09 ASSESSMENT — PATIENT HEALTH QUESTIONNAIRE - PHQ9
SUM OF ALL RESPONSES TO PHQ9 QUESTIONS 1 AND 2: 0
2. FEELING DOWN, DEPRESSED OR HOPELESS: NOT AT ALL
1. LITTLE INTEREST OR PLEASURE IN DOING THINGS: NOT AT ALL

## 2024-09-09 NOTE — PROGRESS NOTES
Subjective   Patient ID: Jessika Rick is a 84 y.o. female who presents for Follow-up (3 month f/u).    HPI 84 year-old female here for follow-up regarding cervical and lumbar spondylosis, as well as occipital neuralgia. 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      Neck pain has subsided since she had RFA.    She currently uses tramadol 50 mg twice daily and Tylenol 1000 mg 3 times daily as needed.  She laso takes gabapentin 300 mg 3x daily.    Bone density done 9/23 ([facility])     She had a previous right carpal tunnel release 2016. EMG 2016 did show moderate to severe bilateral carpal tunnel syndrome.     She had appointment with Dr. Savage 3/23 due to new atrial fibrillation, which was diagnosed 2/10/23. She started eliquis and metoprolol XL 25 mg daily.  30-day event monitor was done.  She has a follow-up visit scheduled for April 2024.     She has living will and HPOA (daughter Herminia Rick- [phone].      daughter Claudine Garcia- [phone])  She brought her living well documented to the office 5/22/23.  She is currently full code.     She had right hip revision by Dr. Hurtado March 21, 2022.     Current medications include tramadol 50 mg twice daily and gabapentin 300 mg 2 times daily (for occipital neuralgia).  Gabapentin was reduced due to leg edema.     Opioid contract renewed 9/09/24...  Urine drug screen was done August 2023- ordered again today.    Right hip was replaced 2/20.     She started AREDS-2 for macular degeneration.  EMG study 5/16 showed moderate to severe carpal tunnel syndrome in both hands, as well as right C6-T1 radiculopathy.      MRI of cervical spine 6/16 showed possible pannus surrounding odontoid, multilevel degenerative disc disease, probable post traumatic or degenerative changes involving the lateral mass of C2 on the right, and anterior subluxations at C3 4, C4 5, C7-T1, T1-T2, and T2-T3.      She remains on gabapentin 300 mg 2times daily for  "occipital neuralgia and tramadol 50 mg 2x daily.      She had right shoulder injected with kenalog 12/19. It was feeling better. She gets slight aching late in day.     She has slight numbness in both feet for the last year.     She has lost 3 1/2 inches in height. Neither parent fractured a hip.   Alendronate was stopped because she was diagnosed with Barretts's esophagus.     She did let me know that her  was recently diagnosed with bladder cancer.      DEXA 12/16 shows femoral neck T score -1.8, total hip T score -2.1, lumbar spine T score -1.3. Total hip BMD is declined 10.4%. Femoral neck BMD is 0.782 g/cm2 ([employer]).  Estimated 10 year fracture risk by FRAX is 5.5% for hip fracture and 16% for major osteoporotic fracture.  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     DEXA 1/19: T score - 1.4 LS, T score -1.8 femoral neck, T score -2.0 total hip.  DEXA August 2021: T score -2.2 femoral neck, T score -2.2 total hip (decline of 6%), T score -1.4 lumbar spine  . Estimated 10-year fracture risk by FRAX is 5.53% for hip fracture and 16.76% for major osteoporotic fracture.     Labs 3/15: BUN 24, creatinine 1.3, KYA negative, rheumatoid factor negative, citrulline antibody negative, ESR 5.  Labs 6/16: ESR 4, CRP negative.   Labs 9/18: ESR 13, CRP 0.28   Labs 4/20: CMP normal, CBC normal  Labs March 2022: Hemoglobin 10.3, hematocrit 32.5, white blood cell count 5.32, platelets 338, CMP normal except albumin 3.8.       ROS:  General: Denies fevers or chills.  CV: Denies chest pain or palpitations.  Denies leg edema.  Lungs: Denies coughing or shortness of breath.  Skin: Denies rashes or nodules.  MS: Neck and low back pain is currently well controlled.    Objective   /76 (BP Location: Left arm, Patient Position: Sitting, BP Cuff Size: Adult)   Pulse 63   Temp 36.9 °C (98.4 °F) (Skin)   Resp 14   Ht 1.499 m (4' 11\")   Wt 60.3 kg (133 lb)   SpO2 100%   BMI 26.86 " kg/m²     Physical Exam  Gen: Well nourished, well appearing.  HEENT: PERRL, EOMI  Neck: Supple, no nodes.  CV: Irregularly irregular rhythm.  No murmur heard.  Lungs: Clear, no rales or wheezes.  Abdomen: Soft, nontender. No hepatosplenomegaly.  Extremities:  No cyanosis, clubbing, or edema.  MS: No synovitis.  Mild kyphosis and scoliosis.  Bony prominence of several DIP joints, consistent with erosive OA.  Skin: No rashes or nodules.        Assessment/Plan   Problem List Items Addressed This Visit             ICD-10-CM    Cervical spondylosis M47.812    Erosive osteoarthritis of both hands M15.4     Other Visit Diagnoses         Codes    High risk medication use    -  Primary Z79.899    Relevant Orders    Opiate/Opioid/Benzo Prescription Compliance    Healthcare maintenance     Z00.00    Relevant Orders    Flu vaccine, trivalent, preservative free, HIGH-DOSE, age 65y+ ([employer])          1. Osteopenia- estimated 10 year fracture risk by FRAX is 5.5% for hip fracture, 16% for major osteoporotic fracture. 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 DEXA 8/21 is slightly worse. For now, she will continue calcium citrate 500 mg daily and vitamin D 1000 IU daily.   DEXA was done 9/23 through [facility].     2. Cervical spondylosis and occipital neuralgia.  She had a recent flare of occipital neuralgia, rating from the right neck to the occiput.  Symptoms were sharp and severe.  Symptoms have subsided, but she is scheduled for radiofrequency ablation next week.   Continue gabapentin 300 mg 2 times daily.   Continue tramadol 50 mg twice daily as needed.   Opioid contract signed 9/09/24.   Urine drug screen done 9/09/24.   Follow-up in 90 days.     3. Right hip pain-she had a right hip revision March 21, 2022 .she is currently progressing well.      4. Right rotator cuff tendinitis- better after kenalog injection 12/19.      5. Erosive OA of hands- I advised the treatment is symptomatic.  Nothing is proven to prevent progression.     6. BMI 26.9- stable     7. Atrial fib. Has established with Dr. Savage in cardiology. [id number] score at least 4. On Eliquis. Sees Dr. Savage. Will follow up in a few weeks.    8. Essential hypertension- at goal. .    9. ACP- she has living will. Daughter Herminia is HPOA. She is full code.    Plan:  High dose flu vaccine was given today.  Opioid contract signed today.  Urine drug screen ordered.  Follow-up in 3 months.

## 2024-09-09 NOTE — PATIENT INSTRUCTIONS
High dose flu vaccine was given today.  Opioid contract signed today.  Urine drug screen ordered.  Follow-up in 3 months.

## 2024-09-13 LAB
1OH-MIDAZOLAM UR CFM-MCNC: <25 NG/ML
6MAM UR CFM-MCNC: <25 NG/ML
7AMINOCLONAZEPAM UR CFM-MCNC: <25 NG/ML
A-OH ALPRAZ UR CFM-MCNC: <25 NG/ML
ALPRAZ UR CFM-MCNC: <25 NG/ML
CHLORDIAZEP UR CFM-MCNC: <25 NG/ML
CLONAZEPAM UR CFM-MCNC: <25 NG/ML
CODEINE UR CFM-MCNC: <50 NG/ML
DIAZEPAM UR CFM-MCNC: <25 NG/ML
EDDP UR CFM-MCNC: <25 NG/ML
FENTANYL UR CFM-MCNC: <2.5 NG/ML
HYDROCODONE CTO UR CFM-MCNC: <25 NG/ML
HYDROMORPHONE UR CFM-MCNC: <25 NG/ML
LORAZEPAM UR CFM-MCNC: <25 NG/ML
METHADONE UR CFM-MCNC: <25 NG/ML
MIDAZOLAM UR CFM-MCNC: <25 NG/ML
MORPHINE UR CFM-MCNC: <50 NG/ML
NORDIAZEPAM UR CFM-MCNC: <25 NG/ML
NORFENTANYL UR CFM-MCNC: <2.5 NG/ML
NORHYDROCODONE UR CFM-MCNC: <25 NG/ML
NOROXYCODONE UR CFM-MCNC: <25 NG/ML
NORTRAMADOL UR-MCNC: >1000 NG/ML
OXAZEPAM UR CFM-MCNC: <25 NG/ML
OXYCODONE UR CFM-MCNC: <25 NG/ML
OXYMORPHONE UR CFM-MCNC: <25 NG/ML
TEMAZEPAM UR CFM-MCNC: <25 NG/ML
TRAMADOL UR CFM-MCNC: >1000 NG/ML
ZOLPIDEM UR CFM-MCNC: <25 NG/ML
ZOLPIDEM UR-MCNC: <25 NG/ML

## 2024-09-23 DIAGNOSIS — M15.4 EROSIVE OSTEOARTHRITIS OF BOTH HANDS: ICD-10-CM

## 2024-09-23 RX ORDER — TRAMADOL HYDROCHLORIDE 50 MG/1
50 TABLET ORAL 2 TIMES DAILY PRN
Qty: 180 TABLET | Refills: 0 | Status: SHIPPED | OUTPATIENT
Start: 2024-09-23

## 2024-10-21 DIAGNOSIS — M15.4 EROSIVE OSTEOARTHRITIS OF BOTH HANDS: ICD-10-CM

## 2024-10-21 RX ORDER — TRAMADOL HYDROCHLORIDE 50 MG/1
50 TABLET ORAL 2 TIMES DAILY PRN
Qty: 180 TABLET | Refills: 0 | Status: SHIPPED | OUTPATIENT
Start: 2024-10-21

## 2024-10-21 NOTE — TELEPHONE ENCOUNTER
Patient is requesting a refill on Tramadol 50mg   Last office visit: 09/09/2024  Last UDS: 09/09/2024  CSA signed: 08/21/2023

## 2024-11-22 DIAGNOSIS — M15.4 EROSIVE OSTEOARTHRITIS OF BOTH HANDS: ICD-10-CM

## 2024-11-22 RX ORDER — TRAMADOL HYDROCHLORIDE 50 MG/1
50 TABLET ORAL 2 TIMES DAILY PRN
Qty: 180 TABLET | Refills: 0 | Status: SHIPPED | OUTPATIENT
Start: 2024-11-22

## 2024-11-22 NOTE — TELEPHONE ENCOUNTER
Patient is requesting a refill on Tramadol 50mg     Last office visit: 09/09/2024    Last UDS: 09/09/2024    CSA signed: 09/09/2024

## 2024-12-08 NOTE — PROGRESS NOTES
"Subjective   Patient ID: Jessika Rick is a 84 y.o. female who presents for cervical and lumbar spondylosis, as well as occipital neuralgia.  She has erosive osteoarthritis of both hands.    HPI 84 year-old female here for follow-up regarding cervical and lumbar spondylosis, as well as occipital neuralgia. 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      Neck pain has subsided since she had RFA.    She currently uses tramadol 50 mg twice daily and Tylenol 1000 mg 3 times daily as needed.  She laso takes gabapentin 300 mg 3x daily.    She is slightly more achy over the last few months, perhaps due to the weather getting colder.  She complains of some aching in both shoulders (right greater than left), right knee, and both feet (left greater than right).  She is wearing [employer] athletic shoes today.  She is stiff for 15 to 20 minutes in the morning.    She notes that she is \" slowing down\".  She now takes stairs 1 step at a time, leading with her left leg.    Bone density done 9/23 ([facility])     She had a previous right carpal tunnel release 2016. EMG 2016 did show moderate to severe bilateral carpal tunnel syndrome.     She had appointment with Dr. Savage 3/23 due to new atrial fibrillation, which was diagnosed 2/10/23. She started eliquis and metoprolol XL 25 mg daily.  30-day event monitor was done.  She has a follow-up visit scheduled for April 2024.     She has living will and HPOA (daughter Herminia Rick- [phone].      daughter Claudine Garcia- [phone])  She brought her living well documented to the office 5/22/23.  She is currently full code.     She had right hip revision by Dr. Hurtado March 21, 2022.     Current medications include tramadol 50 mg twice daily and gabapentin 300 mg 2 times daily (for occipital neuralgia).  Gabapentin was reduced due to leg edema.     Opioid contract renewed 9/09/24...  Urine drug screen was done 9/24.    Right hip was replaced 2/20, s/p revision " 2022.     She started AREDS-2 for macular degeneration.  EMG study 5/16 showed moderate to severe carpal tunnel syndrome in both hands, as well as right C6-T1 radiculopathy.      MRI of cervical spine 6/16 showed possible pannus surrounding odontoid, multilevel degenerative disc disease, probable post traumatic or degenerative changes involving the lateral mass of C2 on the right, and anterior subluxations at C3 4, C4 5, C7-T1, T1-T2, and T2-T3.      She has lost 3 1/2 inches in height. Neither parent fractured a hip.   Alendronate was stopped because she was diagnosed with Barretts's esophagus.     She did let me know that her  was recently diagnosed with bladder cancer.      DEXA 12/16 shows femoral neck T score -1.8, total hip T score -2.1, lumbar spine T score -1.3. Total hip BMD is declined 10.4%. Femoral neck BMD is 0.782 g/cm2 ([employer]).  Estimated 10 year fracture risk by FRAX is 5.5% for hip fracture and 16% for major osteoporotic fracture.  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     DEXA 1/19: T score - 1.4 LS, T score -1.8 femoral neck, T score -2.0 total hip.  DEXA August 2021: T score -2.2 femoral neck, T score -2.2 total hip (decline of 6%), T score -1.4 lumbar spine  . Estimated 10-year fracture risk by FRAX is 5.53% for hip fracture and 16.76% for major osteoporotic fracture.     Labs 3/15: BUN 24, creatinine 1.3, KYA negative, rheumatoid factor negative, citrulline antibody negative, ESR 5.  Labs 6/16: ESR 4, CRP negative.   Labs 9/18: ESR 13, CRP 0.28   Labs 4/20: CMP normal, CBC normal  Labs March 2022: Hemoglobin 10.3, hematocrit 32.5, white blood cell count 5.32, platelets 338, CMP normal except albumin 3.8.  Labs 9/24: Urine drug screen positive for tramadol   Labs 10/24: Hemoglobin A1c 5.5, vitamin B12 1361, CBC normal, CMP normal except creatinine 0.92 (GFR 59), cholesterol 220, , LDL 89, triglycerides 71, ferritin 30, iron 63, TIBC 315,  "TSH 1.74, 25-hydroxy vitamin D 44.6    ROS:  General: Denies fevers or chills.  CV: Denies chest pain or palpitations.  Denies leg edema.  Lungs: Denies coughing or shortness of breath.  Skin: Denies rashes or nodules.  MS: c/o pain in both shoulders, right knee, and both feet.    Objective   Visit Vitals  BP (!) 160/98 (BP Location: Left arm, Patient Position: Sitting, BP Cuff Size: Adult)   Pulse 67   Temp 36.9 °C (98.5 °F) (Skin)   Resp 16   Ht 1.499 m (4' 11\")   Wt 59.8 kg (131 lb 12.8 oz)   SpO2 95%   BMI 26.62 kg/m²   OB Status Postmenopausal   Smoking Status Never   BSA 1.58 m²           Physical Exam  Gen: Well nourished, well appearing.  HEENT: PERRL, EOMI  Neck: Supple, no nodes.  CV: Irregularly irregular rhythm.  No murmur heard.  Lungs: Clear, no rales or wheezes.  Abdomen: Soft, nontender. No hepatosplenomegaly.  Extremities:  No cyanosis, clubbing, or edema.  MS: No synovitis.  Mild kyphosis and scoliosis.  Bony prominence of several DIP joints, consistent with erosive OA.  Skin: No rashes or nodules.        Assessment/Plan   Problem List Items Addressed This Visit             ICD-10-CM    Cervical spondylosis M47.812    Erosive osteoarthritis of both hands - Primary M15.4    Araiza's esophagus without dysplasia K22.70         1. 5. Erosive OA of hands- I advised the treatment is symptomatic. Nothing is proven to prevent progression.    2. Cervical spondylosis and occipital neuralgia.   Continue gabapentin 300 mg 2 times daily.   Continue tramadol 50 mg twice daily as needed.   Opioid contract signed 9/09/24.   Urine drug screen done 9/09/24.   Follow-up in 90 days.     3. Right hip pain-she had a right hip revision March 21, 2022 .she is currently progressing well.      4. Right rotator cuff tendinitis- better after kenalog injection 12/19.      5. BMI 26.- stable     6. Atrial fib. Has established with Dr. Savage in cardiology. [id number] score at least 4. On Eliquis. Sees Dr. Savage. Will follow " up in a few weeks.    7. Essential hypertension- above goal.  I recommend that she start monitoring her blood pressures at home.    8. ACP- she has living will. Daughter Herminia is HPOA. She is full code.    9. Osteopenia-oral bisphosphonate is contraindicated due to Araiza's esophagus.  Bone densities are being followed through Dr. Umanzor.    10. CKD stage 3a- stable, GFR 58 Oct 2024.    Plan:  Continue same medications.  Follow-up in 3 months.

## 2024-12-09 ENCOUNTER — APPOINTMENT (OUTPATIENT)
Dept: RHEUMATOLOGY | Facility: CLINIC | Age: 84
End: 2024-12-09
Payer: MEDICARE

## 2024-12-09 VITALS
RESPIRATION RATE: 16 BRPM | DIASTOLIC BLOOD PRESSURE: 98 MMHG | SYSTOLIC BLOOD PRESSURE: 160 MMHG | WEIGHT: 131.8 LBS | HEART RATE: 67 BPM | TEMPERATURE: 98.5 F | OXYGEN SATURATION: 95 % | HEIGHT: 59 IN | BODY MASS INDEX: 26.57 KG/M2

## 2024-12-09 DIAGNOSIS — K22.70 BARRETT'S ESOPHAGUS WITHOUT DYSPLASIA: ICD-10-CM

## 2024-12-09 DIAGNOSIS — M15.4 EROSIVE OSTEOARTHRITIS OF BOTH HANDS: Primary | ICD-10-CM

## 2024-12-09 DIAGNOSIS — N18.31 STAGE 3A CHRONIC KIDNEY DISEASE (MULTI): ICD-10-CM

## 2024-12-09 DIAGNOSIS — M47.812 CERVICAL SPONDYLOSIS: ICD-10-CM

## 2024-12-09 PROCEDURE — 3077F SYST BP >= 140 MM HG: CPT | Performed by: INTERNAL MEDICINE

## 2024-12-09 PROCEDURE — 1160F RVW MEDS BY RX/DR IN RCRD: CPT | Performed by: INTERNAL MEDICINE

## 2024-12-09 PROCEDURE — 1159F MED LIST DOCD IN RCRD: CPT | Performed by: INTERNAL MEDICINE

## 2024-12-09 PROCEDURE — 99214 OFFICE O/P EST MOD 30 MIN: CPT | Performed by: INTERNAL MEDICINE

## 2024-12-09 PROCEDURE — 1036F TOBACCO NON-USER: CPT | Performed by: INTERNAL MEDICINE

## 2024-12-09 PROCEDURE — 1125F AMNT PAIN NOTED PAIN PRSNT: CPT | Performed by: INTERNAL MEDICINE

## 2024-12-09 PROCEDURE — 3080F DIAST BP >= 90 MM HG: CPT | Performed by: INTERNAL MEDICINE

## 2024-12-09 ASSESSMENT — PAIN SCALES - GENERAL: PAINLEVEL_OUTOF10: 6

## 2024-12-23 DIAGNOSIS — M15.4 EROSIVE OSTEOARTHRITIS OF BOTH HANDS: ICD-10-CM

## 2024-12-23 RX ORDER — TRAMADOL HYDROCHLORIDE 50 MG/1
50 TABLET ORAL 2 TIMES DAILY PRN
Qty: 180 TABLET | Refills: 0 | Status: SHIPPED | OUTPATIENT
Start: 2024-12-23

## 2024-12-23 NOTE — TELEPHONE ENCOUNTER
Patient is requesting a refill on Tramadol 50 mg.     Last office visit: 12/09/22024    Last UDS: 09/09/2024    Last CSA: 09/09/2024

## 2025-01-20 DIAGNOSIS — M15.4 EROSIVE OSTEOARTHRITIS OF BOTH HANDS: ICD-10-CM

## 2025-01-20 RX ORDER — TRAMADOL HYDROCHLORIDE 50 MG/1
50 TABLET ORAL 2 TIMES DAILY PRN
Qty: 180 TABLET | Refills: 0 | Status: SHIPPED | OUTPATIENT
Start: 2025-01-20

## 2025-01-20 NOTE — TELEPHONE ENCOUNTER
Patient is requesting a refill on Tramadol 50 mg     Last office visit: 12/09/2024  Last CSA: 09/09/2024  Last UDS: 09/09/2024

## 2025-02-21 DIAGNOSIS — M15.4 EROSIVE OSTEOARTHRITIS OF BOTH HANDS: ICD-10-CM

## 2025-02-21 RX ORDER — TRAMADOL HYDROCHLORIDE 50 MG/1
50 TABLET ORAL 2 TIMES DAILY PRN
Qty: 180 TABLET | Refills: 0 | Status: SHIPPED | OUTPATIENT
Start: 2025-02-21

## 2025-02-21 NOTE — TELEPHONE ENCOUNTER
pt called in today rerquesting Medication Refill for traMADol (Ultram) 50 mg tablet to be sent to [facility] pharmacy

## 2025-03-17 ENCOUNTER — APPOINTMENT (OUTPATIENT)
Dept: RHEUMATOLOGY | Facility: CLINIC | Age: 85
End: 2025-03-17
Payer: MEDICARE

## 2025-03-17 VITALS
WEIGHT: 129.4 LBS | SYSTOLIC BLOOD PRESSURE: 154 MMHG | OXYGEN SATURATION: 99 % | HEART RATE: 78 BPM | DIASTOLIC BLOOD PRESSURE: 84 MMHG | TEMPERATURE: 98.4 F | BODY MASS INDEX: 26.08 KG/M2 | RESPIRATION RATE: 12 BRPM | HEIGHT: 59 IN

## 2025-03-17 DIAGNOSIS — M54.81 BILATERAL OCCIPITAL NEURALGIA: ICD-10-CM

## 2025-03-17 DIAGNOSIS — M15.4 EROSIVE OSTEOARTHRITIS OF BOTH HANDS: ICD-10-CM

## 2025-03-17 DIAGNOSIS — N18.31 STAGE 3A CHRONIC KIDNEY DISEASE (MULTI): ICD-10-CM

## 2025-03-17 DIAGNOSIS — M47.812 CERVICAL SPONDYLOSIS: ICD-10-CM

## 2025-03-17 DIAGNOSIS — I48.0 PAROXYSMAL ATRIAL FIBRILLATION (MULTI): ICD-10-CM

## 2025-03-17 PROCEDURE — 1159F MED LIST DOCD IN RCRD: CPT | Performed by: INTERNAL MEDICINE

## 2025-03-17 PROCEDURE — 1160F RVW MEDS BY RX/DR IN RCRD: CPT | Performed by: INTERNAL MEDICINE

## 2025-03-17 PROCEDURE — 1125F AMNT PAIN NOTED PAIN PRSNT: CPT | Performed by: INTERNAL MEDICINE

## 2025-03-17 PROCEDURE — 1036F TOBACCO NON-USER: CPT | Performed by: INTERNAL MEDICINE

## 2025-03-17 PROCEDURE — 3079F DIAST BP 80-89 MM HG: CPT | Performed by: INTERNAL MEDICINE

## 2025-03-17 PROCEDURE — 99214 OFFICE O/P EST MOD 30 MIN: CPT | Performed by: INTERNAL MEDICINE

## 2025-03-17 PROCEDURE — 3077F SYST BP >= 140 MM HG: CPT | Performed by: INTERNAL MEDICINE

## 2025-03-17 ASSESSMENT — PAIN SCALES - GENERAL: PAINLEVEL_OUTOF10: 6

## 2025-03-17 NOTE — PROGRESS NOTES
"Subjective   Patient ID: Jessika Rick is a 85 y.o. female who presents for cervical and lumbar spondylosis, as well as occipital neuralgia.  She has erosive osteoarthritis of both hands.    HPI 85 year-old female here for follow-up regarding cervical and lumbar spondylosis, as well as occipital neuralgia. 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      Neck pain has subsided since she had RFA.    She has been in PT for right shoulder and right neck and trapezius pain. She was having trouble reaching overhead but is doing better.    Left hand sometimes goes numb when reading. She had right carpal tunnel release many years ago- was told that she had left carpal tunnel also.    She currently uses tramadol 50 mg twice daily and Tylenol 1000 mg 3 times daily as needed.  She laso takes gabapentin 300 mg 3x daily.    She is wearing "[employer] athletic shoes today.  She is stiff for 15 to 20 minutes in the morning.    She notes that she is \" slowing down\".  She now takes stairs 1 step at a time, leading with her left leg.    Bone density done 9/23 ([facility])     She had a previous right carpal tunnel release 2016. EMG 2016 did show moderate to severe bilateral carpal tunnel syndrome.     She had appointment with Dr. Savage 3/23 due to new atrial fibrillation, which was diagnosed 2/10/23. She started eliquis and metoprolol XL 25 mg daily.  30-day event monitor was done.  She has a follow-up visit scheduled for April 2024.     She has living will and HPOA (daughter Herminia Rick- [phone].      daughter Claudine Garcia- [phone])  She brought her living well documented to the office 5/22/23.  She is currently full code.     She had right hip revision by Dr. Hurtado March 21, 2022.     Current medications include tramadol 50 mg twice daily and gabapentin 300 mg 2 times daily (for occipital neuralgia).  Gabapentin was reduced due to leg edema.     Opioid contract renewed 9/09/24...  Urine drug screen " was done 9/24.    Right hip was replaced 2/20, s/p revision 2022.     She started AREDS-2 for macular degeneration.  EMG study 5/16 showed moderate to severe carpal tunnel syndrome in both hands, as well as right C6-T1 radiculopathy.      MRI of cervical spine 6/16 showed possible pannus surrounding odontoid, multilevel degenerative disc disease, probable post traumatic or degenerative changes involving the lateral mass of C2 on the right, and anterior subluxations at C3 4, C4 5, C7-T1, T1-T2, and T2-T3.      She has lost 3 1/2 inches in height. Neither parent fractured a hip.   Alendronate was stopped because she was diagnosed with Barretts's esophagus.     She did let me know that her  was recently diagnosed with bladder cancer.      DEXA 12/16 shows femoral neck T score -1.8, total hip T score -2.1, lumbar spine T score -1.3. Total hip BMD is declined 10.4%. Femoral neck BMD is 0.782 g/cm2 ([email or web address]).  Estimated 10 year fracture risk by FRAX is 5.5% for hip fracture and 16% for major osteoporotic fracture.  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     DEXA 1/19: T score - 1.4 LS, T score -1.8 femoral neck, T score -2.0 total hip.  DEXA August 2021: T score -2.2 femoral neck, T score -2.2 total hip (decline of 6%), T score -1.4 lumbar spine  . Estimated 10-year fracture risk by FRAX is 5.53% for hip fracture and 16.76% for major osteoporotic fracture.     Labs 3/15: BUN 24, creatinine 1.3, KYA negative, rheumatoid factor negative, citrulline antibody negative, ESR 5.  Labs 6/16: ESR 4, CRP negative.   Labs 9/18: ESR 13, CRP 0.28   Labs 4/20: CMP normal, CBC normal  Labs March 2022: Hemoglobin 10.3, hematocrit 32.5, white blood cell count 5.32, platelets 338, CMP normal except albumin 3.8.  Labs 9/24: Urine drug screen positive for tramadol   Labs 10/24: Hemoglobin A1c 5.5, vitamin B12 1361, CBC normal, CMP normal except creatinine 0.92 (GFR 58), cholesterol 220, HDL  "117, LDL 89, triglycerides 71, ferritin 30, iron 63, TIBC 315, TSH 1.74, 25-hydroxy vitamin D 44.6    ROS:  General: Denies fevers or chills.  CV: Denies chest pain or palpitations.  Denies leg edema.  Lungs: Denies coughing or shortness of breath.  Skin: Denies rashes or nodules.  MS: c/o pain in right shoulder- improving with PT.    Objective   Visit Vitals  /84 (BP Location: Right arm, Patient Position: Sitting, BP Cuff Size: Adult)   Pulse 78   Temp 36.9 °C (98.4 °F) (Skin)   Resp 12   Ht 1.499 m (4' 11\")   Wt 58.7 kg (129 lb 6.4 oz)   SpO2 99%   BMI 26.14 kg/m²   OB Status Postmenopausal   Smoking Status Never   BSA 1.56 m²     Physical Exam  Gen: Well nourished, well appearing.  HEENT: PERRL, EOMI  Neck: Supple, no nodes.  CV: Irregularly irregular rhythm.  No murmur heard.  Lungs: Clear, no rales or wheezes.  Abdomen: Soft, nontender. No hepatosplenomegaly.  Extremities:  No cyanosis, clubbing, or edema.  MS: No synovitis.  Mild kyphosis and scoliosis.  Bony prominence of several DIP joints, consistent with erosive OA.  Atrophy of left thenar eminence.  Skin: No rashes or nodules.        Assessment/Plan   Problem List Items Addressed This Visit             ICD-10-CM    Cervical spondylosis M47.812    Bilateral occipital neuralgia M54.81    Erosive osteoarthritis of both hands M15.4    BMI 26.0-26.9,adult - Primary Z68.26             1. 5. Erosive OA of hands- I advised the treatment is symptomatic. Nothing is proven to prevent progression.    2. Cervical spondylosis and occipital neuralgia.   Continue gabapentin 300 mg 2 times daily.   Continue tramadol 50 mg twice daily as needed.   Opioid contract signed 9/09/24.   Urine drug screen done 9/09/24.   Follow-up in 90 days.     3. Right hip pain-she had a right hip revision March 21, 2022 .she is currently progressing well.      4. Right rotator cuff tendinitis- better after kenalog injection 12/19.  Recently in PT with improvement.     5. BMI 26.- " stable     6. Atrial fib. Has established with Dr. Savage in cardiology. [id number] score at least 4. On Eliquis. Sees Dr. Savage. Will follow up in a few weeks.    7. Essential hypertension- above goal.  I recommend that she start monitoring her blood pressures at home.    8. ACP- she has living will. Daughter Herminia is HPOA. She is full code.    9. Osteopenia-oral bisphosphonate is contraindicated due to Araiza's esophagus.  Bone densities are being followed through Dr. Umanzor.    10. CKD stage 3a- stable, GFR 58 Oct 2024.    Plan:  Continue same medications.  Follow-up in 3 months.

## 2025-03-24 DIAGNOSIS — M15.4 EROSIVE OSTEOARTHRITIS OF BOTH HANDS: ICD-10-CM

## 2025-03-24 RX ORDER — TRAMADOL HYDROCHLORIDE 50 MG/1
50 TABLET ORAL 2 TIMES DAILY PRN
Qty: 180 TABLET | Refills: 0 | Status: SHIPPED | OUTPATIENT
Start: 2025-03-24

## 2025-03-24 NOTE — TELEPHONE ENCOUNTER
Patient is requesting a refill on Tramadol 50 mg     Last office visit: 03/17/2025  Last UDS: 09/09/2024  Last CSA: 09/09/2024      
No

## 2025-04-21 DIAGNOSIS — M15.4 EROSIVE OSTEOARTHRITIS OF BOTH HANDS: ICD-10-CM

## 2025-04-21 RX ORDER — TRAMADOL HYDROCHLORIDE 50 MG/1
50 TABLET ORAL 2 TIMES DAILY PRN
Qty: 180 TABLET | Refills: 0 | Status: SHIPPED | OUTPATIENT
Start: 2025-04-21

## 2025-05-22 DIAGNOSIS — M15.4 EROSIVE OSTEOARTHRITIS OF BOTH HANDS: ICD-10-CM

## 2025-05-22 RX ORDER — TRAMADOL HYDROCHLORIDE 50 MG/1
50 TABLET, FILM COATED ORAL 2 TIMES DAILY PRN
Qty: 180 TABLET | Refills: 0 | Status: SHIPPED | OUTPATIENT
Start: 2025-05-22

## 2025-06-18 ENCOUNTER — APPOINTMENT (OUTPATIENT)
Dept: RHEUMATOLOGY | Facility: CLINIC | Age: 85
End: 2025-06-18
Payer: MEDICARE

## 2025-06-18 VITALS
DIASTOLIC BLOOD PRESSURE: 78 MMHG | HEART RATE: 86 BPM | OXYGEN SATURATION: 99 % | TEMPERATURE: 97.2 F | HEIGHT: 59 IN | RESPIRATION RATE: 16 BRPM | BODY MASS INDEX: 25.6 KG/M2 | SYSTOLIC BLOOD PRESSURE: 118 MMHG | WEIGHT: 127 LBS

## 2025-06-18 DIAGNOSIS — M15.4 EROSIVE OSTEOARTHRITIS OF BOTH HANDS: ICD-10-CM

## 2025-06-18 DIAGNOSIS — M54.81 BILATERAL OCCIPITAL NEURALGIA: Primary | ICD-10-CM

## 2025-06-18 DIAGNOSIS — M47.816 LUMBAR SPONDYLOSIS: ICD-10-CM

## 2025-06-18 DIAGNOSIS — M47.812 CERVICAL SPONDYLOSIS: ICD-10-CM

## 2025-06-18 PROCEDURE — 99214 OFFICE O/P EST MOD 30 MIN: CPT | Performed by: INTERNAL MEDICINE

## 2025-06-18 PROCEDURE — 3074F SYST BP LT 130 MM HG: CPT | Performed by: INTERNAL MEDICINE

## 2025-06-18 PROCEDURE — 3078F DIAST BP <80 MM HG: CPT | Performed by: INTERNAL MEDICINE

## 2025-06-18 PROCEDURE — 1159F MED LIST DOCD IN RCRD: CPT | Performed by: INTERNAL MEDICINE

## 2025-06-18 PROCEDURE — 1160F RVW MEDS BY RX/DR IN RCRD: CPT | Performed by: INTERNAL MEDICINE

## 2025-06-18 PROCEDURE — 1125F AMNT PAIN NOTED PAIN PRSNT: CPT | Performed by: INTERNAL MEDICINE

## 2025-06-18 RX ORDER — TRAMADOL HYDROCHLORIDE 50 MG/1
50 TABLET, FILM COATED ORAL 2 TIMES DAILY PRN
Qty: 180 TABLET | Refills: 0 | Status: SHIPPED | OUTPATIENT
Start: 2025-06-18

## 2025-06-18 ASSESSMENT — PAIN SCALES - GENERAL: PAINLEVEL_OUTOF10: 3

## 2025-06-18 NOTE — PROGRESS NOTES
"Subjective   Patient ID: Jessika Rick is a 85 y.o. female who presents for cervical and lumbar spondylosis, as well as occipital neuralgia.  She has erosive osteoarthritis of both hands.    HPI 85 year-old female here for follow-up regarding cervical and lumbar spondylosis, occipital neuralgia, and  erosive osteoarthritis in the hands.      Neck pain has subsided since she had RFA.    C/o tingling over right lateral hip- possibly into thigh. Worse when standing.    She currently uses tramadol 50 mg twice daily and Tylenol 1000 mg 3 times daily as needed.  She laso takes gabapentin 300 mg 3x daily.    She is wearing [employer] athletic shoes today.  She is stiff for 15 to 20 minutes in the morning.    She notes that she is \" slowing down\".  She now takes stairs 1 step at a time, leading with her left leg.    She still volunteers 3 days/week.    Bone density done 9/23 ([facility])     She had a previous right carpal tunnel release 2016. EMG 2016 did show moderate to severe bilateral carpal tunnel syndrome.     She had appointment with Dr. Savage 3/23 due to new atrial fibrillation, which was diagnosed 2/10/23. She started eliquis and metoprolol XL 25 mg daily.  30-day event monitor was done.  She had follow-up visit scheduled for April 2024.     She has living will and HPOA (daughter Herminia Rick- [phone].      daughter Claudine Garcia- [phone])  She brought her living well documented to the office 5/22/23.  She is currently full code.     She had right hip revision by Dr. Hurtado March 21, 2022.     Current medications include tramadol 50 mg twice daily and gabapentin 300 mg 2 times daily (for occipital neuralgia).  Gabapentin was reduced due to leg edema.     Opioid contract renewed 9/09/24...  Urine drug screen was done 9/24.    Right hip was replaced 2/20, s/p revision 2022.     She started AREDS-2 for macular degeneration.  EMG study 5/16 showed moderate to severe carpal tunnel syndrome in " both hands, as well as right C6-T1 radiculopathy.      MRI of cervical spine 6/16 showed possible pannus surrounding odontoid, multilevel degenerative disc disease, probable post traumatic or degenerative changes involving the lateral mass of C2 on the right, and anterior subluxations at C3 4, C4 5, C7-T1, T1-T2, and T2-T3.      She has lost 3 1/2 inches in height. Neither parent fractured a hip.   Alendronate was stopped because she was diagnosed with Barretts's esophagus.      passed away 2021.     DEXA 12/16 shows femoral neck T score -1.8, total hip T score -2.1, lumbar spine T score -1.3. Total hip BMD is declined 10.4%. Femoral neck BMD is 0.782 g/cm2 ([facility]).  Estimated 10 year fracture risk by FRAX is 5.5% for hip fracture and 16% for major osteoporotic fracture.  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     DEXA 1/19: T score - 1.4 LS, T score -1.8 femoral neck, T score -2.0 total hip.  DEXA August 2021: T score -2.2 femoral neck, T score -2.2 total hip (decline of 6%), T score -1.4 lumbar spine  . Estimated 10-year fracture risk by FRAX is 5.53% for hip fracture and 16.76% for major osteoporotic fracture.     Labs 3/15: BUN 24, creatinine 1.3, KYA negative, rheumatoid factor negative, citrulline antibody negative, ESR 5.  Labs 6/16: ESR 4, CRP negative.   Labs 9/18: ESR 13, CRP 0.28   Labs 4/20: CMP normal, CBC normal  Labs March 2022: Hemoglobin 10.3, hematocrit 32.5, white blood cell count 5.32, platelets 338, CMP normal except albumin 3.8.  Labs 9/24: Urine drug screen positive for tramadol   Labs 10/24: Hemoglobin A1c 5.5, vitamin B12 1361, CBC normal, CMP normal except creatinine 0.92 (GFR 58), cholesterol 220, , LDL 89, triglycerides 71, ferritin 30, iron 63, TIBC 315, TSH 1.74, 25-hydroxy vitamin D 44.6    ROS:  General: Denies fevers or chills.  CV: Denies chest pain or palpitations.  Denies leg edema.  Lungs: Denies coughing or shortness of  "breath.  Skin: Denies rashes or nodules.  MS: c/o chronic neck and low back pain.    Objective   Visit Vitals  /78 (BP Location: Right arm, Patient Position: Sitting, BP Cuff Size: Adult)   Pulse 86   Temp 36.2 °C (97.2 °F)   Resp 16   Ht 1.499 m (4' 11\")   Wt 57.6 kg (127 lb)   SpO2 99%   BMI 25.65 kg/m²   OB Status Postmenopausal   Smoking Status Never   BSA 1.55 m²        Physical Exam  Gen: Well nourished, well appearing.  HEENT: PERRL, EOMI  Neck: Supple, no nodes.  CV: Irregularly irregular rhythm.  No murmur heard.  Lungs: Clear, no rales or wheezes.  Abdomen: Soft, nontender. No hepatosplenomegaly.  Extremities:  No cyanosis, clubbing, or edema.  MS: No synovitis.  Mild kyphosis and scoliosis.  Bony prominence of several DIP joints, consistent with erosive OA.  Atrophy of left thenar eminence.  Skin: No rashes or nodules.        Assessment/Plan   Problem List Items Addressed This Visit           ICD-10-CM    Cervical spondylosis M47.812    Bilateral occipital neuralgia - Primary M54.81    Erosive osteoarthritis of both hands M15.4    BMI 25.0-25.9,adult Z68.25    Lumbar spondylosis M47.816           1. 5. Erosive OA of hands- I advised the treatment is symptomatic. Nothing is proven to prevent progression.    2. Cervical spondylosis and occipital neuralgia.   Continue gabapentin 300 mg 2 times daily.   Continue tramadol 50 mg twice daily as needed.   Opioid contract signed 9/09/24.   Urine drug screen done 9/09/24.   Follow-up in 90 days.     3. Right hip pain-she had a right hip revision March 21, 2022 .she is currently progressing well.      4. Right rotator cuff tendinitis- better after kenalog injection 12/19.  Recently in PT with improvement.     5. BMI 25- stable     6. Atrial fib. Has established with Dr. Savage in cardiology. [id number] score at least 4. On Eliquis. Sees Dr. Savage. Will follow up in a few weeks.    7. Essential hypertension- above goal.  I recommend that she start monitoring " her blood pressures at home.    8. ACP- she has living will. Daughter Herminia is HPOA. She is full code.    9. Osteopenia-oral bisphosphonate is contraindicated due to Araiza's esophagus.  Bone densities are being followed through Dr. Umanzor.    10. CKD stage 3a- stable, GFR 58 Oct 2024.    Plan:  Tramadol was renewed.  Opioid contract was signed 9/24.  Urine drug screen was done 9/24.  Follow-up in 3 months.

## 2025-06-18 NOTE — PATIENT INSTRUCTIONS
Tramadol was renewed.  Opioid contract was signed 9/24.  Urine drug screen was done 9/24.  Follow-up in 3 months.

## 2025-07-21 DIAGNOSIS — M15.4 EROSIVE OSTEOARTHRITIS OF BOTH HANDS: ICD-10-CM

## 2025-07-21 RX ORDER — TRAMADOL HYDROCHLORIDE 50 MG/1
50 TABLET, FILM COATED ORAL 2 TIMES DAILY PRN
Qty: 180 TABLET | Refills: 0 | Status: SHIPPED | OUTPATIENT
Start: 2025-07-21

## 2025-08-21 DIAGNOSIS — M15.4 EROSIVE OSTEOARTHRITIS OF BOTH HANDS: ICD-10-CM

## 2025-08-21 RX ORDER — TRAMADOL HYDROCHLORIDE 50 MG/1
50 TABLET, FILM COATED ORAL 2 TIMES DAILY PRN
Qty: 180 TABLET | Refills: 0 | Status: SHIPPED | OUTPATIENT
Start: 2025-08-21

## 2025-09-24 ENCOUNTER — APPOINTMENT (OUTPATIENT)
Dept: RHEUMATOLOGY | Facility: CLINIC | Age: 85
End: 2025-09-24
Payer: MEDICARE